# Patient Record
Sex: MALE | Race: BLACK OR AFRICAN AMERICAN | Employment: FULL TIME | URBAN - METROPOLITAN AREA
[De-identification: names, ages, dates, MRNs, and addresses within clinical notes are randomized per-mention and may not be internally consistent; named-entity substitution may affect disease eponyms.]

---

## 2021-06-19 ENCOUNTER — HOSPITAL ENCOUNTER (INPATIENT)
Facility: HOSPITAL | Age: 61
LOS: 6 days | Discharge: HOME | DRG: 246 | End: 2021-06-25
Attending: EMERGENCY MEDICINE | Admitting: HOSPITALIST
Payer: COMMERCIAL

## 2021-06-19 ENCOUNTER — APPOINTMENT (OUTPATIENT)
Dept: CT IMAGING | Facility: HOSPITAL | Age: 61
DRG: 246 | End: 2021-06-19
Attending: EMERGENCY MEDICINE
Payer: COMMERCIAL

## 2021-06-19 DIAGNOSIS — R55 SYNCOPE, CARDIOGENIC: Primary | ICD-10-CM

## 2021-06-19 DIAGNOSIS — I63.22: ICD-10-CM

## 2021-06-19 DIAGNOSIS — I63.22 STROKE DUE TO STENOSIS OF BASILAR ARTERY (HCC): ICD-10-CM

## 2021-06-19 DIAGNOSIS — I65.1 BASILAR ARTERY STENOSIS: ICD-10-CM

## 2021-06-19 DIAGNOSIS — I63.22 CEREBRAL INFARCTION DUE TO UNSPECIFIED OCCLUSION OR STENOSIS OF BASILAR ARTERY (HCC): ICD-10-CM

## 2021-06-19 PROCEDURE — 99222 1ST HOSP IP/OBS MODERATE 55: CPT | Performed by: HOSPITALIST

## 2021-06-19 PROCEDURE — 70450 CT HEAD/BRAIN W/O DYE: CPT | Performed by: EMERGENCY MEDICINE

## 2021-06-19 RX ORDER — ATORVASTATIN CALCIUM 20 MG/1
20 TABLET, FILM COATED ORAL ONCE
Status: COMPLETED | OUTPATIENT
Start: 2021-06-19 | End: 2021-06-19

## 2021-06-19 RX ORDER — ASPIRIN 325 MG
325 TABLET ORAL ONCE
Status: COMPLETED | OUTPATIENT
Start: 2021-06-19 | End: 2021-06-19

## 2021-06-19 RX ORDER — METOPROLOL TARTRATE 50 MG/1
50 TABLET, FILM COATED ORAL ONCE
Status: COMPLETED | OUTPATIENT
Start: 2021-06-19 | End: 2021-06-19

## 2021-06-19 RX ORDER — MECLIZINE HYDROCHLORIDE 25 MG/1
25 TABLET ORAL ONCE
Status: COMPLETED | OUTPATIENT
Start: 2021-06-19 | End: 2021-06-19

## 2021-06-19 RX ORDER — ONDANSETRON 2 MG/ML
4 INJECTION INTRAMUSCULAR; INTRAVENOUS ONCE
Status: COMPLETED | OUTPATIENT
Start: 2021-06-19 | End: 2021-06-19

## 2021-06-20 ENCOUNTER — APPOINTMENT (OUTPATIENT)
Dept: CV DIAGNOSTICS | Facility: HOSPITAL | Age: 61
DRG: 246 | End: 2021-06-20
Attending: HOSPITALIST
Payer: COMMERCIAL

## 2021-06-20 PROCEDURE — 99233 SBSQ HOSP IP/OBS HIGH 50: CPT | Performed by: HOSPITALIST

## 2021-06-20 PROCEDURE — 93306 TTE W/DOPPLER COMPLETE: CPT | Performed by: HOSPITALIST

## 2021-06-20 RX ORDER — SODIUM CHLORIDE, SODIUM LACTATE, POTASSIUM CHLORIDE, CALCIUM CHLORIDE 600; 310; 30; 20 MG/100ML; MG/100ML; MG/100ML; MG/100ML
INJECTION, SOLUTION INTRAVENOUS CONTINUOUS
Status: DISCONTINUED | OUTPATIENT
Start: 2021-06-20 | End: 2021-06-20

## 2021-06-20 RX ORDER — PANTOPRAZOLE SODIUM 40 MG/1
40 TABLET, DELAYED RELEASE ORAL
Status: DISCONTINUED | OUTPATIENT
Start: 2021-06-20 | End: 2021-06-25

## 2021-06-20 RX ORDER — METOPROLOL SUCCINATE 25 MG/1
25 TABLET, EXTENDED RELEASE ORAL
Status: DISCONTINUED | OUTPATIENT
Start: 2021-06-21 | End: 2021-06-21

## 2021-06-20 RX ORDER — HYDROCODONE BITARTRATE AND ACETAMINOPHEN 5; 325 MG/1; MG/1
1 TABLET ORAL EVERY 6 HOURS PRN
Status: DISCONTINUED | OUTPATIENT
Start: 2021-06-20 | End: 2021-06-25

## 2021-06-20 RX ORDER — ONDANSETRON 2 MG/ML
4 INJECTION INTRAMUSCULAR; INTRAVENOUS EVERY 6 HOURS PRN
Status: DISCONTINUED | OUTPATIENT
Start: 2021-06-20 | End: 2021-06-25

## 2021-06-20 RX ORDER — ONDANSETRON 2 MG/ML
4 INJECTION INTRAMUSCULAR; INTRAVENOUS EVERY 4 HOURS PRN
Status: ACTIVE | OUTPATIENT
Start: 2021-06-20 | End: 2021-06-20

## 2021-06-20 RX ORDER — HEPARIN SODIUM 5000 [USP'U]/ML
5000 INJECTION, SOLUTION INTRAVENOUS; SUBCUTANEOUS EVERY 12 HOURS SCHEDULED
Status: DISCONTINUED | OUTPATIENT
Start: 2021-06-20 | End: 2021-06-21

## 2021-06-20 RX ORDER — HYDRALAZINE HYDROCHLORIDE 20 MG/ML
10 INJECTION INTRAMUSCULAR; INTRAVENOUS EVERY 6 HOURS PRN
Status: DISCONTINUED | OUTPATIENT
Start: 2021-06-20 | End: 2021-06-25

## 2021-06-20 RX ORDER — DEXTROSE MONOHYDRATE 25 G/50ML
50 INJECTION, SOLUTION INTRAVENOUS
Status: DISCONTINUED | OUTPATIENT
Start: 2021-06-20 | End: 2021-06-25

## 2021-06-20 RX ORDER — HEPARIN SODIUM 5000 [USP'U]/ML
5000 INJECTION, SOLUTION INTRAVENOUS; SUBCUTANEOUS EVERY 12 HOURS
Status: DISCONTINUED | OUTPATIENT
Start: 2021-06-20 | End: 2021-06-20

## 2021-06-20 RX ORDER — ACETAMINOPHEN 325 MG/1
650 TABLET ORAL EVERY 6 HOURS PRN
Status: DISCONTINUED | OUTPATIENT
Start: 2021-06-20 | End: 2021-06-25

## 2021-06-20 RX ORDER — AMLODIPINE BESYLATE 5 MG/1
5 TABLET ORAL DAILY
Status: DISCONTINUED | OUTPATIENT
Start: 2021-06-20 | End: 2021-06-20

## 2021-06-20 NOTE — ED PROVIDER NOTES
Patient Seen in: [facility] Emergency Department    History   Patient presents with:  Nausea/Vomiting/Diarrhea      HPI    The patient presents to the ED complaining of intermittent episodes over the past several weeks where he feels lightheaded, (36.8 °C)   Temp src 06/19/21 2245 Temporal   SpO2 06/19/21 2033 97 %   O2 Device 06/19/21 2033 None (Room air)       All measures to prevent infection transmission during my interaction with the patient were taken.  The patient was already wearing a drople CBC WITH DIFFERENTIAL WITH PLATELET    Narrative: The following orders were created for panel order CBC With Differential With Platelet.                   Procedure                               Abnormality         Status (325 mg Oral Given 6/19/21 2245)   atorvastatin (LIPITOR) tab 20 mg (20 mg Oral Given 6/19/21 2245)   Metoprolol Tartrate (LOPRESSOR) tab 50 mg (50 mg Oral Given 6/19/21 2245)   Meclizine HCl (ANTIVERT) tab 25 mg (25 mg Oral Given 6/19/21 2340)         MDM obtaining history, performing a physical exam, bedside monitoring of interventions, collecting and interpreting tests and discussion with consultants but not including time spent performing procedures.     Condition upon leaving the department: Stable    Elenore Donate

## 2021-06-20 NOTE — PLAN OF CARE
Denies dizziness. Plan for 2Decho today. Pt stated that he is having pain on his left ear rated at about 5-6. Norco given and pt verbalized relief. Continue to monitor.    Problem: CARDIOVASCULAR - ADULT  Goal: Maintains optimal cardiac output and hemodynam

## 2021-06-20 NOTE — PLAN OF CARE
Pt states he still feels dizzy when he moves. No complaints of nausea or vomiting episodes. IVF initiated. Plan is to have an ECHO today. Will DC home with family once medically cleared. Fall precautions in place and wife at the bedside.     Problem: Patien behaviors that affect risk of falls.   - [place] fall precautions as indicated by assessment.  - Educate pt/family on patient safety including physical limitations  - Instruct pt to call for assistance with activity based on assessment  - Modify environme ordered  - Assess for signs and symptoms of hyperglycemia and hypoglycemia  - Administer ordered medications to maintain glucose within target range  - Assess barriers to adequate nutritional intake and initiate nutrition consult as needed  - Instruct kat

## 2021-06-20 NOTE — ED QUICK NOTES
Orders for admission, patient is aware of plan and ready to go upstairs. Any questions, please call ED RN kelly  at extension [phone].    Type of COVID test sent: rapid  COVID Suspicion level: Low    LOC at time of transport: a/o x3/3

## 2021-06-20 NOTE — PROGRESS NOTES
[facility]    Progress Note    Leifsonia Gill Patient Status:  Inpatient    7/10/1960 MRN [id number]   Location [facility] 3W/SW Attending Thuan Baltazar MD   Hosp Day # 1 PCP None Pcp     CC: N/V syncope   Subjective:    Ivon Abel Heparin Sodium (Porcine)  5,000 Units Subcutaneous 2 times per day   • [START ON 6/21/2021] metoprolol succinate  25 mg Oral Daily Beta Blocker       Current PRN Inpatient Meds:      glucose **OR** Glucose-Vitamin C **OR** dextrose **OR** glucose **OR** Gl Prediabetes  - diabetes educ ation  - ADA diet.    - monitor    Obesity   ZACHARY  - CPAP at bedtime  - counseled on weight loss    ETOH use  - counseled on cessation  - monitor for now     Quality:  · Diet: AHA   · PT/OT: deferred  · DVT Prophylaxis: SCD,

## 2021-06-20 NOTE — ED INITIAL ASSESSMENT (HPI)
Vomiting since 5pm. Patient complains of dizziness past few days   Notes feeling lightheaded.    Patient covered in emesis

## 2021-06-20 NOTE — CONSULTS
Cardiology Consult Note:  56year old male, with no significant past medical history presents to the ER after multiple episodes of lightheadedness and near syncope over the last few weeks.     He describes a sensation of lightheadedness whenever he gets up Exam:   Blood pressure (!) 150/94, pulse 61, temperature 98.2 °F (36.8 °C), temperature source Oral, resp. rate 16, height 6' 2\" (1.88 m), weight 275 lb (124.7 kg), SpO2 96 %.     Scheduled Meds:   • Insulin Aspart Pen  1-5 Units Subcutaneous TID CC and HS

## 2021-06-20 NOTE — PLAN OF CARE
Pt reported history of blurred vision prior to admission to the doctor. Denies blurred vision at this moment. Neuro checks started and will be done q 4. No other neurological signs noted at this time. VSS. Plan for MRI brain tomorrow.  Echo still needs to b

## 2021-06-20 NOTE — H&P
Malik Harrison. Patient Status:  Emergency    7/10/1960 MRN [id number]   Location [address] Attending J Carlos Mayen MD   Hosp Day # 0 PCP No primary care provider on file [] 88  Resp:  [16-36] 23  BP: (140-178)/() 152/79    General:  Alert and oriented. Diffuse skin problem:  None. Eye:  Pupils are equal, round and reactive to light, extraocular movements are intact, Normal conjunctiva.   HENT:  Normocephalic, Consider CPAP at night. Prophylaxis  Subcutaneous heparin    CODE STATUS  Full    Primary care physician  No primary care provider on file.     Disposition  Clinical course will dictate outcome      Rayshawn Rosas MD  6/19/2021  11:21 PM

## 2021-06-21 ENCOUNTER — APPOINTMENT (OUTPATIENT)
Dept: CT IMAGING | Facility: HOSPITAL | Age: 61
DRG: 246 | End: 2021-06-21
Attending: INTERNAL MEDICINE
Payer: COMMERCIAL

## 2021-06-21 ENCOUNTER — APPOINTMENT (OUTPATIENT)
Dept: CT IMAGING | Facility: HOSPITAL | Age: 61
DRG: 246 | End: 2021-06-21
Attending: Other
Payer: COMMERCIAL

## 2021-06-21 ENCOUNTER — APPOINTMENT (OUTPATIENT)
Dept: MRI IMAGING | Facility: HOSPITAL | Age: 61
DRG: 246 | End: 2021-06-21
Attending: HOSPITALIST
Payer: COMMERCIAL

## 2021-06-21 PROCEDURE — 70496 CT ANGIOGRAPHY HEAD: CPT | Performed by: OTHER

## 2021-06-21 PROCEDURE — 99223 1ST HOSP IP/OBS HIGH 75: CPT | Performed by: OTHER

## 2021-06-21 PROCEDURE — 75574 CT ANGIO HRT W/3D IMAGE: CPT | Performed by: INTERNAL MEDICINE

## 2021-06-21 PROCEDURE — 99233 SBSQ HOSP IP/OBS HIGH 50: CPT | Performed by: HOSPITALIST

## 2021-06-21 PROCEDURE — 70551 MRI BRAIN STEM W/O DYE: CPT | Performed by: HOSPITALIST

## 2021-06-21 PROCEDURE — 70498 CT ANGIOGRAPHY NECK: CPT | Performed by: OTHER

## 2021-06-21 RX ORDER — METOPROLOL TARTRATE 5 MG/5ML
5 INJECTION INTRAVENOUS SEE ADMIN INSTRUCTIONS
Status: DISCONTINUED | OUTPATIENT
Start: 2021-06-21 | End: 2021-06-23

## 2021-06-21 RX ORDER — ASPIRIN 300 MG
300 SUPPOSITORY, RECTAL RECTAL DAILY
Status: DISCONTINUED | OUTPATIENT
Start: 2021-06-21 | End: 2021-06-25

## 2021-06-21 RX ORDER — CLOPIDOGREL BISULFATE 75 MG/1
300 TABLET ORAL ONCE
Status: COMPLETED | OUTPATIENT
Start: 2021-06-21 | End: 2021-06-21

## 2021-06-21 RX ORDER — CHLORHEXIDINE GLUCONATE 4 G/100ML
30 SOLUTION TOPICAL
Status: COMPLETED | OUTPATIENT
Start: 2021-06-22 | End: 2021-06-22

## 2021-06-21 RX ORDER — DILTIAZEM HYDROCHLORIDE 5 MG/ML
5 INJECTION INTRAVENOUS SEE ADMIN INSTRUCTIONS
Status: DISCONTINUED | OUTPATIENT
Start: 2021-06-21 | End: 2021-06-23

## 2021-06-21 RX ORDER — METOPROLOL SUCCINATE 50 MG/1
50 TABLET, EXTENDED RELEASE ORAL
Status: DISCONTINUED | OUTPATIENT
Start: 2021-06-22 | End: 2021-06-23

## 2021-06-21 RX ORDER — ACETAMINOPHEN 650 MG/1
650 SUPPOSITORY RECTAL EVERY 4 HOURS PRN
Status: DISCONTINUED | OUTPATIENT
Start: 2021-06-21 | End: 2021-06-25

## 2021-06-21 RX ORDER — METOCLOPRAMIDE HYDROCHLORIDE 5 MG/ML
10 INJECTION INTRAMUSCULAR; INTRAVENOUS EVERY 8 HOURS PRN
Status: DISCONTINUED | OUTPATIENT
Start: 2021-06-21 | End: 2021-06-25

## 2021-06-21 RX ORDER — CLOPIDOGREL BISULFATE 75 MG/1
75 TABLET ORAL DAILY
Status: DISCONTINUED | OUTPATIENT
Start: 2021-06-22 | End: 2021-06-25

## 2021-06-21 RX ORDER — ATORVASTATIN CALCIUM 80 MG/1
80 TABLET, FILM COATED ORAL NIGHTLY
Status: DISCONTINUED | OUTPATIENT
Start: 2021-06-21 | End: 2021-06-25

## 2021-06-21 RX ORDER — DOCUSATE SODIUM 100 MG/1
100 CAPSULE, LIQUID FILLED ORAL 2 TIMES DAILY PRN
Status: DISCONTINUED | OUTPATIENT
Start: 2021-06-21 | End: 2021-06-25

## 2021-06-21 RX ORDER — METOPROLOL TARTRATE 100 MG/1
100 TABLET ORAL ONCE
Status: COMPLETED | OUTPATIENT
Start: 2021-06-22 | End: 2021-06-22

## 2021-06-21 RX ORDER — SODIUM CHLORIDE 9 MG/ML
INJECTION, SOLUTION INTRAVENOUS
Status: ACTIVE | OUTPATIENT
Start: 2021-06-22 | End: 2021-06-22

## 2021-06-21 RX ORDER — METOPROLOL TARTRATE 50 MG/1
50 TABLET, FILM COATED ORAL ONCE
Status: COMPLETED | OUTPATIENT
Start: 2021-06-21 | End: 2021-06-21

## 2021-06-21 RX ORDER — METOPROLOL TARTRATE 50 MG/1
50 TABLET, FILM COATED ORAL ONCE AS NEEDED
Status: COMPLETED | OUTPATIENT
Start: 2021-06-21 | End: 2021-06-21

## 2021-06-21 RX ORDER — ASPIRIN 325 MG
325 TABLET ORAL DAILY
Status: DISCONTINUED | OUTPATIENT
Start: 2021-06-21 | End: 2021-06-25

## 2021-06-21 RX ORDER — ACETAMINOPHEN 325 MG/1
650 TABLET ORAL EVERY 4 HOURS PRN
Status: DISCONTINUED | OUTPATIENT
Start: 2021-06-21 | End: 2021-06-25

## 2021-06-21 RX ORDER — DILTIAZEM HYDROCHLORIDE 5 MG/ML
INJECTION INTRAVENOUS
Status: DISPENSED
Start: 2021-06-21 | End: 2021-06-22

## 2021-06-21 RX ORDER — SODIUM CHLORIDE 9 MG/ML
INJECTION, SOLUTION INTRAVENOUS CONTINUOUS
Status: ACTIVE | OUTPATIENT
Start: 2021-06-21 | End: 2021-06-23

## 2021-06-21 RX ORDER — HEPARIN SODIUM 5000 [USP'U]/ML
5000 INJECTION, SOLUTION INTRAVENOUS; SUBCUTANEOUS EVERY 8 HOURS SCHEDULED
Status: DISCONTINUED | OUTPATIENT
Start: 2021-06-21 | End: 2021-06-25

## 2021-06-21 RX ORDER — METOPROLOL TARTRATE 100 MG/1
100 TABLET ORAL ONCE AS NEEDED
Status: COMPLETED | OUTPATIENT
Start: 2021-06-21 | End: 2021-06-21

## 2021-06-21 RX ORDER — METOPROLOL TARTRATE 5 MG/5ML
INJECTION INTRAVENOUS
Status: DISPENSED
Start: 2021-06-21 | End: 2021-06-22

## 2021-06-21 RX ORDER — ONDANSETRON 2 MG/ML
4 INJECTION INTRAMUSCULAR; INTRAVENOUS EVERY 6 HOURS PRN
Status: DISCONTINUED | OUTPATIENT
Start: 2021-06-21 | End: 2021-06-25

## 2021-06-21 RX ORDER — METOPROLOL TARTRATE 50 MG/1
50 TABLET, FILM COATED ORAL ONCE
Status: COMPLETED | OUTPATIENT
Start: 2021-06-22 | End: 2021-06-22

## 2021-06-21 RX ORDER — LABETALOL HYDROCHLORIDE 5 MG/ML
10 INJECTION, SOLUTION INTRAVENOUS EVERY 10 MIN PRN
Status: DISCONTINUED | OUTPATIENT
Start: 2021-06-21 | End: 2021-06-25

## 2021-06-21 RX ORDER — NITROGLYCERIN 0.4 MG/1
0.4 TABLET SUBLINGUAL ONCE
Status: DISCONTINUED | OUTPATIENT
Start: 2021-06-21 | End: 2021-06-23

## 2021-06-21 RX ORDER — METOPROLOL TARTRATE 100 MG/1
100 TABLET ORAL ONCE
Status: COMPLETED | OUTPATIENT
Start: 2021-06-21 | End: 2021-06-21

## 2021-06-21 NOTE — PHYSICAL THERAPY NOTE
PT evaluation orders received and chart reviewed. Spoke to RN, Ingrid Agarwal, who communicated that pt is preparing to go to CT. Will re-attempt this p.m. as schedule permits. 2nd attempt to see pt made this date at 16:00- pt off floor for imaging.  Will angelica

## 2021-06-21 NOTE — PLAN OF CARE
Problem: Patient Centered Care  Goal: Patient preferences are identified and integrated in the patient's plan of care  Description: Interventions:  - What would you like us to know as we care for you?  I'm a  and have been having these episode and hemodynamic stability  Description: INTERVENTIONS:  - Monitor vital signs, rhythm, and trends  - Monitor for bleeding, hypotension and signs of decreased cardiac output  - Evaluate effectiveness of vasoactive medications to optimize hemodynamic stabili maintained  Description: INTERVENTIONS:  - Monitor labs and assess for signs and symptoms of volume excess or deficit  - Monitor intake, output and patient weight  - Monitor urine specific gravity, serum osmolarity and serum sodium as indicated or ordered appropriately for assistance. Bed rest, plan for MRI and stress test in am. Neuro checks q 4, pt without complaints of dizziness or vision changes. Will continue to monitor.      **Pt had episode of frequent PVCs, dizziness and was diaphoretic, EKG obtained

## 2021-06-21 NOTE — CM/SW NOTE
06/21/21 1600   CM/SW Referral Data   Referral Source Physician   Reason for Referral   [facility] TERM ACUTE [facility] CARE AT [facility] Evaluation)   Informant Patient   Patient Info   Patient's [address]   Number of Levels in Home 2   Number of Stair in Home 12   Patient lives with

## 2021-06-21 NOTE — IMAGING NOTE
1222 TO RAD HOLDING VIA BED BY TRANSPORT FROM ROOM 304    HX TAKEN : INPT , C/O DIZZINESS, ARRHYTHMIA ON TELE.      PT CONSENTED      BASELINE VITAL SIGNS   HR 58-68  BP  153/81  BMI 36.4  LBS     CTA ORDERED BY INPT      METOPROLOL PO GIVEN 50 MILLIG

## 2021-06-21 NOTE — PROGRESS NOTES
Patient seen in follow up. STAN RN. At around 1 am pt had dizziness and run of ventricular rhythm at the same time. This morning also he is having runs of ventricular rhythm and excess PVCs. No reported chest pain or sob. No new complaints.  Has a s mg, Oral, Daily Beta Blocker  hydrALAzine HCl (APRESOLINE) injection 10 mg, 10 mg, Intravenous, Q6H PRN  carbamide peroxide (EAR DROPS EARWAX AID) 6.5 % otic solution 5 drop, 5 drop, Left Ear, BID      No medications prior to admission.     CT BRAIN OR HEAD

## 2021-06-21 NOTE — PAYOR COMM NOTE
--------------  ADMISSION REVIEW     Payor: [address]  Subscriber #:  [id number]  Authorization Number: [id number]      Admit date: 6/19/21  Admit time: 11:59 PM       Admitting Physician: Lazarus Fagan MD  Attending Physician:  Xavier Reynolds medications prior to admission. No family history on file.     Smoking Status: Social History    Tobacco Use      Smoking status: Never Smoker      Smokeless tobacco: Never Used      ROS  Pertinent positives: Dizziness, lightheaded, sweating, syncope Tenderness: There is no abdominal tenderness. Musculoskeletal:         General: No deformity. Skin:     General: Skin is warm and dry. Neurological:      Mental Status: He is alert and oriented to person, place, and time.    Psychiatric:         Mood rm:42  [id number]    Additional Information (per Vision Radiologist):          CT HEAD (without contrast)    IMPRESSION:  No acute intracranial abnormality by CT. No acute intracranial hemorrhage or pathologic extra-axial fluid collection.  No acute fracture over the past several weeks. Patient feeling well on ED arrival and no significant symptoms during his ED stay. No evidence for ACS given nonischemic EKG and normal troponin. Normal CT head and laboratory testing otherwise.   Single episode of nonsustain 6/19/2021    Chief Complaint:  Patient presents with:  Nausea/Vomiting/Diarrhea      History of Present Illness:   Xin Berry is a(n) 56year old male, with no significant past medical history presents to the ER after multiple episodes of lightheadedness atraumatic. Neck:  Supple, non-tender, no carotid bruit, no jugular venous distention, no lymphadenopathy, no thyromegaly.   Respiratory:  Lungs are clear to auscultation, respirations are non-labored, breath sounds are equal, symmetrical chest wall expans ADMINISTERED IN LAST 1 DAY:  amLODIPine Besylate (NORVASC) tab 5 mg     Date Action Dose Route User    6/20/2021 1311 Given 5 mg Oral Elinor Hansen RN      carbamide peroxide (EAR DROPS EARWAX AID) 6.5 % otic solution 5 drop     Date Action Dose Route 6/20/2021  2:16 PM  6/21  IMPRESSION:  Presyncope with associated ventricular run and PVCs on the monitor. No anginal symptoms.     PLAN:     Cancel stress test. FU ekg today. Coronary CT angiogram. Increase Toprol to 50/d.  PVC morphology is RBBB and super

## 2021-06-21 NOTE — SLP NOTE
SLP orders received and acknowledged. SLP attempt this PM, however, pt off unit for testing. SLP to follow up as pt available/appropriate. Please contact SLP with any questions, concerns, and/or change in status. Thank you. Annita Rosenbaum M.S. [id number] [facility]

## 2021-06-21 NOTE — PROGRESS NOTES
[facility]    Progress Note    [place] Me[place] Patient Status:  Inpatient    7/10/1960 MRN [id number]   Location [facility] 3W/SW Attending Marielle Heller MD   Hosp Day # 2 PCP None Pcp     CC: N/V syncope   Subjective:    Efren Parikh Subcutaneous TID CC and HS   • Pantoprazole Sodium  40 mg Oral QAM AC   • Heparin Sodium (Porcine)  5,000 Units Subcutaneous 2 times per day   • carbamide peroxide  5 drop Left Ear BID       Current PRN Inpatient Meds:      glucose **OR** Glucose-Vitamin C criteria for LVH met.  - Nonspecific T-abnormality. ABNORMAL When compared with ECG of 06/19/2021 20:39:15 intermittent sinus beats are now present Electronically signed on 06/21/2021 at 10:51 by JEMMA Cr     EKG 12 Lead    Result Date: 6/19/2021  ECG R

## 2021-06-21 NOTE — PLAN OF CARE
VS stable. Scheduled for CTA today and MRI of the brain. Denies dizziness this morning. Wife at bedside. NPO and consent signed for CTA. Patient updated on POC and safety.     Problem: Patient Centered Care  Goal: Patient preferences are identified and inte strengthening/mobility  - Encourage toileting schedule  Outcome: Progressing     Problem: CARDIOVASCULAR - ADULT  Goal: Maintains optimal cardiac output and hemodynamic stability  Description: INTERVENTIONS:  - Monitor vital signs, rhythm, and trends  - Mo ordered  - Instruct patient on fluid and nutrition restrictions as appropriate  Outcome: Progressing  Goal: Hemodynamic stability and optimal renal function maintained  Description: INTERVENTIONS:  - Monitor labs and assess for signs and symptoms of volume aphasic patients to use assistive/communication devices  Outcome: Progressing

## 2021-06-21 NOTE — CONSULTS
[facility]  [facility], [address] [place] [address]  [phone]          [place] [id number]    Report of Consultation    Sumit Vizcarra Patient Status:  Inpatient     7/10/1960 MRN  symptoms concerning for facial droop with a? Ipsilateral ptosis. MRI of the brain was ordered on Sunday, 6/20/2021. It revealed multiple embolic infarcts in the posterior circulation earlier today. HPI reviewed with the patient and his wife.   3 week higher rate of stroke recurrence compared to extracranial atherosclerotic disease. I explained that the risk of recurrent stroke was 10 to 12 %/year.   Explained that his risk of recurrent stroke was inherently higher, and that even if he did not have a ca Resource Strain:       Difficulty of Paying Living Expenses:   Food Insecurity:       Worried About [employer] in the Last Year:       Ran Out of Food in the Last Year:   Transportation Needs:       Lack of Transportation (Medical):       Lack of HCl, acetaminophen, HYDROcodone-acetaminophen, hydrALAzine HCl      Objective:    Last vitals and weight :   06/21/21  1315   BP:    Pulse: 64   Resp:    Temp:      @FLOWCHS(14)@    Exam:  - General: appears stated age and no distress  - CV: Normal S1-S2 R 2+ 2+  2+ 2+   L 2+ 2+  2+ 2+   Adductor Spread: No adductor spread noted. Frontal release signs:Not assessed.     Jaw Jerk:    Danelle's sign:absent   Nonsustained clonus: Absent   Sustained clonus: Absent   - Sensory:   Light touch: normal  Tempera Imaging revealed: multiple posterior circulation infarct including in the tip of the occipital lobe and throughout the cerebellum. CTA demonstrates short segment of severe  basilar stenosis            Kamilla Borrego is a 56year old RH  man w/ a pmhx sig.  fo necessary given the disease seen on his CT scan, low suspicion that it is responsible for his recurrent episodes.       1. Short segment focal stenosis of the basilar artery with multiple posterior circulation infarcts in the setting of 2 weeks of recurrent elevated for the next 7 to 14 days. This is to allow flow through the focal area of stenosis while collateral vessels are hopefully forming. Additional brain and vascular imaging ordered:   a. None at this time. No indication for cerebral angiography.   Debara Bosworth neuroimaging and cardiovascular studies. Disclaimer: This record was dictated using [address]. There may be errors due to voice recognition problems that were not realized and corrected during the completion of the note. Thank you.   Vi

## 2021-06-22 ENCOUNTER — APPOINTMENT (OUTPATIENT)
Dept: MRI IMAGING | Facility: HOSPITAL | Age: 61
DRG: 246 | End: 2021-06-22
Attending: Other
Payer: COMMERCIAL

## 2021-06-22 ENCOUNTER — APPOINTMENT (OUTPATIENT)
Dept: CT IMAGING | Facility: HOSPITAL | Age: 61
DRG: 246 | End: 2021-06-22
Attending: Other
Payer: COMMERCIAL

## 2021-06-22 ENCOUNTER — APPOINTMENT (OUTPATIENT)
Dept: INTERVENTIONAL RADIOLOGY/VASCULAR | Facility: HOSPITAL | Age: 61
DRG: 246 | End: 2021-06-22
Attending: INTERNAL MEDICINE
Payer: COMMERCIAL

## 2021-06-22 PROCEDURE — 99233 SBSQ HOSP IP/OBS HIGH 50: CPT | Performed by: HOSPITALIST

## 2021-06-22 PROCEDURE — 70546 MR ANGIOGRAPH HEAD W/O&W/DYE: CPT | Performed by: OTHER

## 2021-06-22 PROCEDURE — B2151ZZ FLUOROSCOPY OF LEFT HEART USING LOW OSMOLAR CONTRAST: ICD-10-PCS | Performed by: INTERNAL MEDICINE

## 2021-06-22 PROCEDURE — 99233 SBSQ HOSP IP/OBS HIGH 50: CPT | Performed by: OTHER

## 2021-06-22 PROCEDURE — B2111ZZ FLUOROSCOPY OF MULTIPLE CORONARY ARTERIES USING LOW OSMOLAR CONTRAST: ICD-10-PCS | Performed by: INTERNAL MEDICINE

## 2021-06-22 PROCEDURE — 027034Z DILATION OF CORONARY ARTERY, ONE ARTERY WITH DRUG-ELUTING INTRALUMINAL DEVICE, PERCUTANEOUS APPROACH: ICD-10-PCS | Performed by: INTERNAL MEDICINE

## 2021-06-22 PROCEDURE — 70551 MRI BRAIN STEM W/O DYE: CPT | Performed by: OTHER

## 2021-06-22 PROCEDURE — 4A023N7 MEASUREMENT OF CARDIAC SAMPLING AND PRESSURE, LEFT HEART, PERCUTANEOUS APPROACH: ICD-10-PCS | Performed by: INTERNAL MEDICINE

## 2021-06-22 RX ORDER — NITROGLYCERIN 20 MG/100ML
INJECTION INTRAVENOUS
Status: COMPLETED
Start: 2021-06-22 | End: 2021-06-22

## 2021-06-22 RX ORDER — DIPHENHYDRAMINE HYDROCHLORIDE 50 MG/ML
INJECTION INTRAMUSCULAR; INTRAVENOUS
Status: COMPLETED
Start: 2021-06-22 | End: 2021-06-22

## 2021-06-22 RX ORDER — VERAPAMIL HYDROCHLORIDE 2.5 MG/ML
INJECTION, SOLUTION INTRAVENOUS
Status: COMPLETED
Start: 2021-06-22 | End: 2021-06-22

## 2021-06-22 RX ORDER — MIDAZOLAM HYDROCHLORIDE 1 MG/ML
INJECTION INTRAMUSCULAR; INTRAVENOUS
Status: COMPLETED
Start: 2021-06-22 | End: 2021-06-22

## 2021-06-22 RX ORDER — HEPARIN SODIUM 1000 [USP'U]/ML
INJECTION, SOLUTION INTRAVENOUS; SUBCUTANEOUS
Status: COMPLETED
Start: 2021-06-22 | End: 2021-06-22

## 2021-06-22 RX ORDER — SODIUM CHLORIDE 9 MG/ML
INJECTION, SOLUTION INTRAVENOUS CONTINUOUS
Status: DISCONTINUED | OUTPATIENT
Start: 2021-06-22 | End: 2021-06-23

## 2021-06-22 RX ORDER — CLOPIDOGREL BISULFATE 75 MG/1
TABLET ORAL
Status: COMPLETED
Start: 2021-06-22 | End: 2021-06-22

## 2021-06-22 RX ORDER — SODIUM CHLORIDE 9 MG/ML
INJECTION, SOLUTION INTRAVENOUS CONTINUOUS
Status: DISCONTINUED | OUTPATIENT
Start: 2021-06-22 | End: 2021-06-22

## 2021-06-22 RX ORDER — LIDOCAINE HYDROCHLORIDE 20 MG/ML
INJECTION, SOLUTION EPIDURAL; INFILTRATION; INTRACAUDAL; PERINEURAL
Status: COMPLETED
Start: 2021-06-22 | End: 2021-06-22

## 2021-06-22 NOTE — PROGRESS NOTES
Patient seen in follow up.    Tele improved, 1 ventricular run  Dw neurology last night, primary service  Time spent > 35 min     06/22/21  0604   BP: (!) 123/93   Pulse: 63   Resp: 18   Temp: 98.1 °F (36.7 °C)       Intake/Output Summary (Last 24 flores mg, Oral, Daily  glucose (DEX4) oral liquid 15 g, 15 g, Oral, Q15 Min PRN   Or  Glucose-Vitamin C (DEX-4) chewable tab 4 tablet, 4 tablet, Oral, Q15 Min PRN   Or  dextrose 50 % injection 50 mL, 50 mL, Intravenous, Q15 Min PRN   Or  glucose (DEX4) oral liqu (KBP=75404/61307)    Result Date: 6/21/2021  CONCLUSION:  1. Short segment severe stenosis of the basilar artery. 2. No flow limiting stenosis, occlusion or dissection of the major cervical arteries.   Mild calcific atherosclerosis involving the proximal in

## 2021-06-22 NOTE — PROGRESS NOTES
MD note:    D/w Dr. Tabitha Wadsworth. Patient with multiple foci of stroke with severe stenosis of proximal basilar artery. Recommend DAPT/Statin and permissive hypertension. Will load with plavix tonight.

## 2021-06-22 NOTE — OCCUPATIONAL THERAPY NOTE
OCCUPATIONAL THERAPY EVALUATION - INPATIENT     Room Number: 156/970-T  Evaluation Date: 6/22/2021  Type of Evaluation: Quick Eval  Presenting Problem:  (sycope)    Physician Order: IP Consult to Occupational Therapy  Reason for Therapy: ADL/IADL Dysfunc history. HOME SITUATION  Type of Home: House  Home Layout: Two level; Able to live on main level  Lives With: Spouse  Toilet and Equipment: Standard height toilet  Shower/Tub and Equipment: Walk-in shower; Tub-shower combo  Other Equipment: None  Occupati SBA    Bedroom Mobility: SBA-CGA with RW    FUNCTIONAL ADL ASSESSMENT  Grooming: mod I   Feeding: indep  Bathing: cga  Toileting: sba  Upper Extremity Dressing: indep  Lower Extremity Dressing: sba-cga    Education Provided: Educated pt on role of OT in ac

## 2021-06-22 NOTE — PROGRESS NOTES
[facility] HOSP - [facility]    Progress Note    Anita Sheppard Patient Status:  Inpatient    7/10/1960 MRN [id number]   Location [facility] 3W/SW Attending Kalie Schwab MD   Hosp Day # 3 PCP None Pcp     CC: dizziness   Subjective:    Anita Sheppard Units Subcutaneous Q8H [facility]   • Clopidogrel Bisulfate  75 mg Oral Daily   • Insulin Aspart Pen  1-5 Units Subcutaneous TID CC and HS   • Pantoprazole Sodium  40 mg Oral QAM AC   • carbamide peroxide  5 drop Left Ear BID       Current PRN Inpatient Meds: esophagus at the gastroesophageal junction may be related to esophagitis. If clinically indicated, further evaluation with direct visualization could be performed. CT cardiac over read.  Please see dedicated report by the cardiologist.   Dictated by (CST): in Er and now with ventricular run/PVCs on tele  - 2D ECHO - normal EF, no wma  - MRI brain with multiple lesions of acute CVA B/L L>R cerebellar lacunar, tiny cortical infarcts R occipital lobe and acute lacunar infarcts in inferior archana.    - neuro checks

## 2021-06-22 NOTE — DIETARY NOTE
NUTRITION EDUCATION NOTE    Received consult for nutrition education. Appropriate education and handout provided. See education section of Epic for specifics.     Lydia Guzman RDN, LDN   Clinical Nutrition  Ext [phone]

## 2021-06-22 NOTE — PAYOR COMM NOTE
--------------  CONTINUED STAY REVIEW    Payor: [address] [facility]  Subscriber #:  [id number]  Authorization Number: [id number]      Admit date: 6/19/21  Admit time: 11:59 PM    FAXING CLINICAL UPDATE FOR 6/21/21- 6/22/21 6/21/21   CC: N/V syncope cards recs and plan CTA instead, EP consult  - orthostatic V/S negative.    - cards following  - consider neuro eval if cardiac workup neg.      Essential HTN   - has been off meds x months   - start metoprolol 12.5mg daily ( will help NSVT he had in ER) - 3.9     --  109  --  107   CO2 25.0  --  26.0  --  25.0     Imaging/EKG:   MRI BRAIN (CPT=70551)     Result Date: 6/21/2021  CONCLUSION:  1. Multiple acute bilateral left greater than right cerebellar lacunar infarcts.   2. Tiny cortical and subcortic met.  - Nonspecific T-abnormality. ABNORMAL When compared with ECG of 06/21/2021 00:24:22 PVC's have decreased Electronically signed on 06/21/2021 at 10:51 by JEMMA Bolanos     Assessment and Plan:      Near Syncope  Secondary to acute CVA and CAD.    - Had Given 30 mL Topical Marcell Calhoun RN      Clopidogrel Bisulfate (PLAVIX) tab 300 mg     Date Action Dose Route User    6/21/2021 2340 Given 300 mg Oral Marcell Calhoun RN      Clopidogrel Bisulfate (PLAVIX) tab 75 mg     Date Action Dose Route Date Action Dose Route User    6/21/2021 2032 Given 50 mg Oral Maddie Bui RN      Metoprolol Tartrate (LOPRESSOR) tab 50 mg     Date Action Dose Route User    6/22/2021 7431 Given 50 mg Oral Maddie Bui RN      Pantoprazole Sodium (SD

## 2021-06-22 NOTE — PHYSICAL THERAPY NOTE
PHYSICAL THERAPY EVALUATION - INPATIENT     Room Number: 536/197-W  Evaluation Date: 6/22/2021  Type of Evaluation: Initial   Physician Order: PT Eval and Treat    Presenting Problem: syncope  Reason for Therapy: Mobility Dysfunction and Discharge Ishan from home with family support. Utilized RW this session as pt was s/p heart catheterization, however, trial without next session    Patient will benefit from continued IP PT services to address these deficits in preparation for discharge.     DISCHARGE REC Room Air: 99    AM-PAC '6-Clicks' [address]  How much difficulty does the patient currently have. ..  -   Turning over in bed (including adjusting bedclothes, sheets and blankets)?: None   -   Sitting down on and standing up from instructions provided to patient in preparation for discharge.    Goal #5   Current Status

## 2021-06-22 NOTE — PLAN OF CARE
VS stable. Cardiac cath today. R wrist and R groin access. Pedal pulses palpable. Wife at bedside. Neuro checks q4. No neuro changes.      Problem: Patient Centered Care  Goal: Patient preferences are identified and integrated in the patient's plan of care toileting schedule  Outcome: Progressing     Problem: CARDIOVASCULAR - ADULT  Goal: Maintains optimal cardiac output and hemodynamic stability  Description: INTERVENTIONS:  - Monitor vital signs, rhythm, and trends  - Monitor for bleeding, hypotension and nutrition restrictions as appropriate  Outcome: Progressing  Goal: Hemodynamic stability and optimal renal function maintained  Description: INTERVENTIONS:  - Monitor labs and assess for signs and symptoms of volume excess or deficit  - Monitor intake, out assistive/communication devices  Outcome: Progressing

## 2021-06-22 NOTE — PHYSICAL THERAPY NOTE
PT evaluation orders received and chart reviewed. Patient is off floor for L heart cath. Will re-attempt this p.m. as schedule permits.      Thank you,  Jerardo Musa,  PT, DPT

## 2021-06-22 NOTE — PLAN OF CARE
Pt. resting comfortably throughout most of shift. Wife at bedside. IV fluids continued. No complaints of CP or SOB. Neuro q4. No significant neuro changes overnight. Pt. had one episode of dizziness. NPO at midnight. No complaints at this time.  Will contin assessment  - Modify environment to reduce risk of injury  - Provide assistive devices as appropriate  - Consider OT/PT consult to assist with strengthening/mobility  - Encourage toileting schedule  Outcome: Progressing     Problem: CARDIOVASCULAR - ADULT to increase activity and self care with guidance from PT/OT  - Encourage visually impaired, hearing impaired and aphasic patients to use assistive/communication devices  Outcome: Progressing

## 2021-06-22 NOTE — PROCEDURES
[facility]    PATIENT'S NAME: OSVALDO ELKINS   ATTENDING PHYSICIAN: Wallace Pina MD   OPERATING PHYSICIAN: Manas Ta DO   PATIENT ACCOUNT#:   [de-identified]    LOCATION:  [address] [place] [address] RECORD #:   [id number]       DATE OF BIRTH:  [birth year]/ descending artery has a 30% heavily calcified ostial stenosis. Then the vessel is ectatic/aneurysmal throughout with mild obstruction appreciated. The circumflex coronary artery is a large but nondominant vessel giving rise to 1 major marginal branch.   I A 95% mid circumflex coronary artery stenosis status post drug-eluting stent reduced to 0%.   2.   Chronic total occlusion of a small right coronary artery with distal vessel of poor caliber probed with a wire confirming that it is in fact a chronic occlus

## 2021-06-22 NOTE — SLP NOTE
Interval History: Pt seen and examined. Small bowl obstruction, continue NPO and NGT to LIWS. Will follow.     Review of Systems   Constitutional: Positive for appetite change (decreased). Negative for chills, diaphoresis, fatigue and fever.   HENT: Negative for congestion, ear discharge, rhinorrhea, sinus pressure, sore throat and trouble swallowing.    Eyes: Negative for discharge and visual disturbance.   Respiratory: Negative for apnea, cough, choking, chest tightness, shortness of breath, wheezing and stridor.    Cardiovascular: Negative for chest pain, palpitations and leg swelling.   Gastrointestinal: Positive for abdominal pain and nausea. Negative for abdominal distention, diarrhea and vomiting.   Endocrine: Negative for cold intolerance and heat intolerance.   Genitourinary: Negative for dysuria, frequency and hematuria.   Musculoskeletal: Negative for arthralgias, back pain, myalgias and neck pain.   Skin: Negative for pallor and rash.   Neurological: Negative for dizziness, seizures, syncope, weakness and headaches.   Psychiatric/Behavioral: Negative for agitation, confusion and sleep disturbance.     Objective:     Vital Signs (Most Recent):  Temp: 97.3 °F (36.3 °C) (03/13/20 0729)  Pulse: 89 (03/13/20 0729)  Resp: 18 (03/13/20 0729)  BP: (!) 167/80 (03/13/20 0729)  SpO2: 96 % (03/13/20 0729) Vital Signs (24h Range):  Temp:  [97.3 °F (36.3 °C)-98 °F (36.7 °C)] 97.3 °F (36.3 °C)  Pulse:  [] 89  Resp:  [16-20] 18  SpO2:  [96 %-97 %] 96 %  BP: (142-183)/(66-88) 167/80     Weight: 73.3 kg (161 lb 11.2 oz)  Body mass index is 21.33 kg/m².    Intake/Output Summary (Last 24 hours) at 3/13/2020 1216  Last data filed at 3/13/2020 0655  Gross per 24 hour   Intake 3093.33 ml   Output 1250 ml   Net 1843.33 ml      Physical Exam   Constitutional: He is oriented to person, place, and time. No distress.   HENT:   Mouth/Throat: No oropharyngeal exudate.   Eyes: Right eye exhibits no discharge. Left eye exhibits  SPEECH/LANGUAGE/COGNITIVE EVALUATION - INPATIENT    Admission Date: 6/19/2021  Evaluation Date: 06/22/21    Reason for Referral: Stroke protocol    ASSESSMENT & PLAN   ASSESSMENT & IMPRESSION    Imaging Results:   MRI 6/22/21:  CONCLUSION:   1. Multiple ac PLAN:    Recommend f/u for cognitive-communicate treatment at next level of care.        Discharge Recommendations/Plan: Undetermined    Patient Experiencing Pain: No  Prior Living Situation: Home with spouse  Prior Level of Function: Independent no discharge.   Neck: No JVD present.   Cardiovascular: Exam reveals no gallop and no friction rub.   No murmur heard.  Pulmonary/Chest: No stridor. No respiratory distress. He has no wheezes. He has no rales. He exhibits no tenderness.   Abdominal: He exhibits no distension and no mass. There is no tenderness. There is no rebound and no guarding. No hernia.   NGT low wall suction    Musculoskeletal: He exhibits no edema or deformity.   Neurological: He is alert and oriented to person, place, and time.   Skin: Skin is warm and dry. Capillary refill takes less than 2 seconds. He is not diaphoretic.   Nursing note and vitals reviewed.      Significant Labs:   CBC:   Recent Labs   Lab 03/11/20 2220 03/12/20  1043 03/13/20  0552   WBC 16.72* 10.26 9.63   HGB 8.8* 8.1* 8.5*   HCT 28.4* 27.9* 28.9*   * 297 317     CMP:   Recent Labs   Lab 03/11/20 2220 03/12/20  1043 03/13/20  0552    140 141   K 4.9 5.3* 5.0    106 109   CO2 18* 24 20*   * 111* 118*   BUN 39* 43* 48*   CREATININE 2.8* 2.9* 2.7*   CALCIUM 10.0 9.8 9.6   PROT 8.5* 8.1  --    ALBUMIN 3.7 3.6  --    BILITOT 0.4 0.3  --    ALKPHOS 196* 191*  --    AST 19 20  --    ALT 13 14  --    ANIONGAP 16 10 12   EGFRNONAA 22* 21* 23*       Significant Imaging:     Imaging Results          X-Ray Chest AP Portable (Final result)  Result time 03/12/20 09:22:40    Final result by Saji Issa MD (03/12/20 09:22:40)                 Impression:      As above.      Electronically signed by: Saji Issa  Date:    03/12/2020  Time:    09:22             Narrative:    EXAMINATION:  XR CHEST AP PORTABLE    CLINICAL HISTORY:  . Presence of other specified devices    TECHNIQUE:  Single frontal portable view of the chest was performed.    COMPARISON:  02/02/2020    FINDINGS:  Support devices:    Esophagogastric tube in satisfactory position overlying the body of the stomach.    Chronic interstitial coarsening.  Linear reticular interstitial opacification  reduced in severity compared to 02/02/2020.  No pneumothorax.  Suggestion of tiny right pleural effusion unchanged.  Surgical clips right axilla and left cervical region.    The cardiac silhouette is normal in size. The hilar and mediastinal contours are unremarkable.    Bones are intact.                               CT Abdomen Pelvis  Without Contrast (Final result)  Result time 03/12/20 08:01:28    Final result by Aurelio Curtis MD (03/12/20 08:01:28)                 Impression:      Prominent small bowel loops are seen throughout the abdomen with transition suspected within the upper pelvis likely at the site of a bowel anastomosis with decompressed ileum distally.  Small bowel loops are thickened at the level near the obstruction without evidence of pneumatosis.  Findings concerning for bowel obstruction.    Small hiatal hernia containing contrast and mild thickening at the GE junction likely reflects reflux disease.  Thickening of the rugal folds throughout the stomach likely reflects gastritis. Recommend endoscopy follow-up.    All CT scans at this facility use dose modulation, iterative reconstruction and/or weight based dosing when appropriate to reduce radiation dose to as low as reasonably achievable.      Electronically signed by: Aurelio Curtis MD  Date:    03/12/2020  Time:    08:01             Narrative:    EXAMINATION:  CT ABDOMEN PELVIS WITHOUT CONTRAST    CLINICAL HISTORY:  Abdominal pain, unspecified;    TECHNIQUE:  Routine 5 mm non-contrast images of the abdomen and pelvis were done.  Sagittal and coronal reformats were also submitted for interpretation.    COMPARISON:  None    FINDINGS:  No consolidation.  Chronic interstitial thickening suspected at the right lung base.  Acute interstitial pneumonia not excluded.  No pleural effusion or pneumothorax identified heart size is normal.  Severe coronary disease.    The liver is normal in size and attenuation with no focal hepatic abnormality.   Moderate gallbladder distension.  No stones are identified.  No significant intrahepatic ductal dilatation.  Common bile duct is dilated measuring 11 mm without focal obstruction or stone.    The spleen, pancreas, and adrenal glands are unremarkable.  Benign splenic granuloma are noted.    Kidneys are small in size.  Nonobstructing stone lower pole of the right kidney.  Horseshoe configuration is noted.  Nonobstructing stone lower pole right kidney.  Mild left-sided hydronephrosis double-J ureteral stent is noted in satisfactory position.  Bladder demonstrates mild nonspecific bladder wall thickening which can be seen with cystitis..    Small hiatal hernia containing contrast and mild thickening at the GE junction likely reflects reflux disease.  Thickening of the rugal folds throughout the stomach likely reflects gastritis.  Recommend endoscopy follow-up.  Duodenal diverticulum is seen along the 3rd portion the duodenum.  Prominent small bowel loops are seen throughout the abdomen with transition suspected within the upper pelvis likely at the site of a bowel anastomosis with decompressed ileum distally.  Findings concerning for bowel obstruction.  Large bowel demonstrates fatty infiltration of the wall of the colon likely reflecting chronic inflammatory process.  Mild constipation.  Appendix is not seen..  Moderate diverticulosis seen throughout the descending and sigmoid colon.  No definite evidence for acute diverticulitis.  Trace ascites.  No free air.    There is no evidence of lymph node enlargement in the abdomen or pelvis.  Shotty nodes are seen within the mesentery and retroperitoneum.    The abdominal aorta is small caliber with severe atherosclerotic calcifications.  Subclavian femoral bypass and fem-fem bypass grafts are noted.  Extensive clips are seen in the bilateral groin.    Moderate degenerative changes throughout the lumbar spine.  Chronic right rib fracture deformity involving the 7th rib  laterally.  Chronic compression deformities at L3 and L1.                               X-Ray Abdomen Flat And Erect (Final result)  Result time 03/11/20 22:41:16    Final result by Aurelio Briceno MD (03/11/20 22:41:16)                 Impression:      Dilated loops of small bowel with multiple air-fluid levels consistent with a small-bowel obstruction.  No definite evidence of free air.      Electronically signed by: Aurelio Briceno MD  Date:    03/11/2020  Time:    22:41             Narrative:    EXAMINATION:  XR ABDOMEN FLAT AND ERECT    CLINICAL HISTORY:  Generalized abdominal pain    TECHNIQUE:  Flat and erect AP views of the abdomen were performed.    COMPARISON:  03/27/2019    FINDINGS:  Left ureteral stent.  Multiple air-fluid levels with distended small bowel loops consistent with a small bowel obstruction.

## 2021-06-22 NOTE — OPERATIVE REPORT
Preop diagnosis: abnormal CTA  Post op diagnosis: CAD  Procedures: LHC cors, stent to LCX  Findings: proximal to mid LCX with 95% stenosis, proximal RCA 60-70% stenosis then 100%  where the vessel becomes small, apically severe distal disease involving

## 2021-06-22 NOTE — SLP NOTE
ADULT SWALLOWING EVALUATION    ASSESSMENT    ASSESSMENT/OVERALL IMPRESSION:    This BSE was ordered d/t stroke protocol. No PMH of dysphagia at [facility]. Pt denies any past swallowing difficulty. Pt on solid/thin liquids at home, with spouse.      Brain MRI 5/ care. BSE results/recommendations discussed with Pt; fair understanding/reinforcement needed. PLAN:    To f/u x1-2 sessions/meals for dysphagia treatment. Will ensure safe tolerance of diet and reinforce swallowing/aspiration precautions.  Will monitor silent aspiration.)    GOALS  Goal #1 The patient will tolerate solid consistency and thin  liquids without overt signs or symptoms of aspiration with 100 % accuracy over 1-2 session(s).   In Progress   Goal #2 The patient will utilize compensatory strategi

## 2021-06-23 PROCEDURE — 99233 SBSQ HOSP IP/OBS HIGH 50: CPT | Performed by: HOSPITALIST

## 2021-06-23 PROCEDURE — 99233 SBSQ HOSP IP/OBS HIGH 50: CPT | Performed by: OTHER

## 2021-06-23 RX ORDER — METOPROLOL SUCCINATE 100 MG/1
100 TABLET, EXTENDED RELEASE ORAL
Status: DISCONTINUED | OUTPATIENT
Start: 2021-06-24 | End: 2021-06-25

## 2021-06-23 RX ORDER — SODIUM CHLORIDE 0.9 % (FLUSH) 0.9 %
10 SYRINGE (ML) INJECTION AS NEEDED
Status: DISCONTINUED | OUTPATIENT
Start: 2021-06-23 | End: 2021-06-25

## 2021-06-23 RX ORDER — MIDODRINE HYDROCHLORIDE 5 MG/1
5 TABLET ORAL AS NEEDED
Status: DISCONTINUED | OUTPATIENT
Start: 2021-06-23 | End: 2021-06-25

## 2021-06-23 RX ORDER — SODIUM CHLORIDE 9 MG/ML
INJECTION, SOLUTION INTRAVENOUS CONTINUOUS
Status: DISCONTINUED | OUTPATIENT
Start: 2021-06-23 | End: 2021-06-25

## 2021-06-23 RX ORDER — MIDODRINE HYDROCHLORIDE 5 MG/1
5 TABLET ORAL 3 TIMES DAILY
Status: DISCONTINUED | OUTPATIENT
Start: 2021-06-23 | End: 2021-06-23

## 2021-06-23 RX ORDER — SODIUM CHLORIDE 9 MG/ML
INJECTION, SOLUTION INTRAVENOUS
Status: ACTIVE | OUTPATIENT
Start: 2021-06-24 | End: 2021-06-24

## 2021-06-23 NOTE — PHYSICAL THERAPY NOTE
PHYSICAL THERAPY TREATMENT NOTE - INPATIENT     Room Number: 365/247-V       Presenting Problem: syncope    Problem List  Principal Problem:    Syncope, cardiogenic      PHYSICAL THERAPY ASSESSMENT   During this session the following PPE was worn by this t education; Family education;Gait training;Range of motion;Transfer training;Balance training;Strengthening;Stoop training;Stair training    SUBJECTIVE  \"I had another dizzy episode yesterday.  I feel fine now\"    OBJECTIVE  Precautions: Cardiac    WEIGHT B Patient's self-stated goal is: return to PLOF   Goal #1 Patient is able to demonstrate supine - sit EOB @ level: independent      Goal #1   Current Status  Independent supine>sit   Goal #2 Patient is able to demonstrate transfers Sit to/from Stand at [facility]

## 2021-06-23 NOTE — SLP NOTE
SPEECH DAILY NOTE - INPATIENT    ASSESSMENT & PLAN   ASSESSMENT  PPE: DROPLET MASK AND GLOVES. HAND SANITIZED UPON ENTRANCE/EXIT.      RN reports pt with new s/s stroke yesterday including R facial droop and weakness after angiogram - s/s have since subside posterior intracranial circulations, as well as the bilaterality of findings, an   embolic etiology is favored. 3. A punctate evolving acute/subacute lacunar infarct of the archana is redemonstrated.    4. Senescent changes of parenchymal volume loss with se

## 2021-06-23 NOTE — PAYOR COMM NOTE
--------------  CONTINUED STAY REVIEW    Payor: [address] [facility]  Subscriber #:  [id number]  Authorization Number: [id number]      Admit date: 6/19/21  Admit time: 11:59 PM    FAXING CLINICAL UPDATE FOR 6/23/21 6/23/21   Subjective:    Jackeline Vivas 140  --  139 137   K 4.1  --  3.9 3.9     --  107 107   CO2 26.0  --  25.0 24.0      Imaging:   MRA, HEAD (W+WO) (CPT=70546)     Result Date: 6/22/2021  CONCLUSION:  1.  Focal short segment severe high-grade stenosis of the basilar artery is redemonst anterior and posterior intracranial circulations, as well as the bilaterality of findings, an embolic etiology is favored. 3. A punctate evolving acute/subacute lacunar infarct of the archana is redemonstrated.   4. Senescent changes of parenchymal volume los mg     Date Action Dose Route     6/23/2021 0903 Given 325 mg Oral       atorvastatin (LIPITOR) tab 80 mg     Date Action Dose Route     6/22/2021 2127 Given 80 mg Oral       Clopidogrel Bisulfate (PLAVIX) tab 75 mg     Date Action Dose RoutUsere     6/23/

## 2021-06-23 NOTE — PROGRESS NOTES
Pt wasn't feeling right, soon after Dr. Yue Lynn arrived it was changed to Stroke Alert. Pt's wife Eladio Bach was present and attentive at bedside. Pt was generally asymptomatic upon neurologist's visit, but CT was ordered to check for changes.   Remained with

## 2021-06-23 NOTE — PROGRESS NOTES
Patient seen in follow up. Has some dizziness. No cp or sob. Had some R facial droop that resolved. No arm or leg weakness. Tele shows ventricular rhythm runs/ very slow VT at ~110.   Time spent > 35 min     06/23/21  0617   BP: 121/87   Pulse: 79 Subcutaneous, Q8H [address]  Clopidogrel Bisulfate (PLAVIX) tab 75 mg, 75 mg, Oral, Daily  glucose (DEX4) oral liquid 15 g, 15 g, Oral, Q15 Min PRN   Or  Glucose-Vitamin C (DEX-4) chewable tab 4 tablet, 4 tablet, Oral, Q15 Min PRN   Or  dextrose 50 % injection 5 CONCLUSION:  1. Multiple acute bilateral left greater than right cerebellar lacunar infarcts. 2. Tiny cortical and subcortical acute infarcts in right occipital lobe. No large acute cortical infarct. 3. Tiny acute lacunar infarcts in the inferior archana. embolic etiology. 2. A multitude of evolving acute bilateral cerebellar infarcts are redemonstrated, grossly similar in distribution to previous.  Given the involvement of the anterior and posterior intracranial circulations, as well as the bilaterality of

## 2021-06-23 NOTE — PROGRESS NOTES
[facility]  [address] [facility], [address] [place] [address]  [phone]        INPATIENT STROKE NEUROLOGY   FOLLOW UP PROGRESS NOTE  [facility]    Beulah Hampton Patient Status:  Inpatient     7/10/1960 MRN [id number] Plan    Diagnostics:  1. Imaging: MRI brain WO ; Stat three sequence MRI and MRA of the head. MRA of the head  will overestimate stenosis. 2.   Therapeutics:  1. Blood pressure goal: Permissive hypertension: Upper limit of goal per cardiology.  Otherwise noted that the corner of his right eyelid was somewhat slanted, but the most prominent feature was the right-sided facial droop. The patient had no other motor or sensory deficits. He was full strength throughout.  He then became diaphoretic, and reported d amplitude. .    Rapid movements: Rapid/fine movements are symmetric. As expected their dominant hand is slightly faster. Leg Drift:  None.  ke 5/5      Nonsustained clonus: Absent   Sustained clonus: Absent   - Sensory:   Light touch: normal    - [place] LIGHT GREEN    Collection Time: 06/19/21  8:42 PM   Result Value Ref Range    Hold Lt Green Auto Resulted    RAINBOW DRAW GOLD    Collection Time: 06/19/21  8:42 PM   Result Value Ref Range    Hold Gold Auto Resulted    Basic Metabolic Panel (8)    Collect Time: 06/19/21 10:33 PM    Specimen: Nares;  Other   Result Value Ref Range    Rapid SARS-CoV-2 by PCR Not Detected Not Detected   Basic Metabolic Panel (8)    Collection Time: 06/20/21  5:54 AM   Result Value Ref Range    Glucose 104 (H) 70 - 99 mg/dL    S Ref Range    POC Glucose  142 (H) 70 - 99 mg/dL   POCT Glucose    Collection Time: 06/21/21 12:39 AM   Result Value Ref Range    POC Glucose  102 (H) 70 - 99 mg/dL   Lipid Panel    Collection Time: 06/21/21  5:28 AM   Result Value Ref Range    Cholesterol, 53.0 %    MCV 98.9 80.0 - 100.0 fL    MCH 32.7 26.0 - 34.0 pg    MCHC 33.1 31.0 - 37.0 g/dL    RDW-SD 44.5 35.1 - 46.3 fL    RDW 12.3 11.0 - 15.0 %    .0 150.0 - 450.0 10(3)uL    Neutrophil Absolute Prelim 2.10 1.50 - 7.70 x10 (3) uL    Neutrophil A Result Date: 6/21/2021  CONCLUSION:  Coronary artery calcifications. Mild wall thickening of the distal esophagus at the gastroesophageal junction may be related to esophagitis.  If clinically indicated, further evaluation with direct visualization could b 10:51 by Nelda Muniz Performed an independent visualization of  Mri mra   Imaging revealed: multiple posterior circulation infarct including in the tip of the occipital lobe and throughout the cerebellum.  CTA demonstrates short segment of severe  bas

## 2021-06-23 NOTE — PLAN OF CARE
Pt. resting in bed throughout most of the shift. Pt. continues to complain of intermittent dizziness and weakness. Patient now requires x1 assistance and walker to get to bathroom due to dizziness. Neuro checks q4. IV fluids continued.  IV Zofran given for including physical limitations  - Instruct pt to call for assistance with activity based on assessment  - Modify environment to reduce risk of injury  - Provide assistive devices as appropriate  - Consider OT/PT consult to assist with strengthening/mobilit

## 2021-06-23 NOTE — PROGRESS NOTES
[facility]OSP - [facility]    Progress Note    Sumit Vizcarra Patient Status:  Inpatient    7/10/1960 MRN [id number]   Location [facility] 3W/SW Attending John Lemus MD   Hosp Day # 4 PCP None Pcp     CC: dizziness   Subjective:    Sumit Vizcarra Intravenous On Call   • Benzocaine  1 spray Mouth/Throat Once   • aspirin  300 mg Rectal Daily    Or   • aspirin  325 mg Oral Daily   • atorvastatin  80 mg Oral Nightly   • Heparin Sodium (Porcine)  5,000 Units Subcutaneous Q8H [address]   • Clopidogrel Bisulfat as termination of the right vertebral artery as PICA. 3. There is no hemodynamically significant stenosis or occlusion of the anterior intracranial circulation. 4. Fetal origin left posterior cerebral artery, a developmental variant.    5. Lesser incident Brooke Mackenzie MD on 6/21/2021 at 5:05 PM     Finalized by (CST): Brooke Mackenzie MD on 6/21/2021 at 5:17 PM          MRI BRAIN WO ACUTE (3) SEQUENCE (CPT=70551)    Result Date: 6/22/2021  CONCLUSION:  Limited 3-sequence stroke protocol study was pe circ 5% stenosis, proximal RCA 60-70% stenosis and 100%  distal RCA. S/p 1 KALEIGH to Lcx.   - cont DAPT  - cont metoprolol 100mg daily and lipitor 80mg daily   - dietitian to see  - cardiac rehab.    - cardiology following.   - tele.      Essential HTN   -

## 2021-06-23 NOTE — PROGRESS NOTES
Brief neurology progress note    MRI reviewed. Interval infarct in the right frontal lobe noted. Infarct in the anterior circulation is larger than one would expect from his cardiac cath, although that remains a possibility.   Suspect that this is an asym

## 2021-06-23 NOTE — SIGNIFICANT EVENT
RRT    *See RRT Documentation Record*    Reason the RRT was called: right facial droop and dizziness  Assessment of patient leading up to RRT:   HR: 111, BP: 169/112, SpO2: 99%.  Pt. diaphoretic with slight right facial droop, dizziness, and weakness    Int

## 2021-06-23 NOTE — DIABETES ED
[facility] - [facility]    Diabetes Education  Note    Travis Lee Patient Status:  Inpatient   7/10/1960 MRN [id number]  Location [facility] 3W/SW Attending Livan Dunlap MD  Hosp Day # 4 PCP None Pcp      Labs:    HgbA1C (%)   Date Value

## 2021-06-23 NOTE — PROGRESS NOTES
RRT/Stroke alert called for new onset R facial droop. Wife called RN to bedside as she noticed patient's R face was droopy. BUE and BLE equal , no numbness no tingling. R groin, R radial site CDI.  Patient then started c/o dizziness, and generalized wea

## 2021-06-23 NOTE — PLAN OF CARE
Patient is a/ox4, forgetful. RA. Standby assist. Accucheck ACHS. PT, OT, SLP worked with the patient today; see notes. Daily NIHSS. Neuro checks q4h. IVF @ 125 ml/hr. Plan is for a ADEEL tomorrow. Will continue to monitor.     Problem: Patient Centered Care appropriate  - Consider OT/PT consult to assist with strengthening/mobility  - Encourage toileting schedule  Outcome: Progressing     Problem: CARDIOVASCULAR - ADULT  Goal: Maintains optimal cardiac output and hemodynamic stability  Description: INTERVENTI results as appropriate  - Fluid restriction as ordered  - Instruct patient on fluid and nutrition restrictions as appropriate  Outcome: Progressing  Goal: Hemodynamic stability and optimal renal function maintained  Description: INTERVENTIONS:  - Monitor l Encourage visually impaired, hearing impaired and aphasic patients to use assistive/communication devices  Outcome: Progressing

## 2021-06-24 PROCEDURE — 99233 SBSQ HOSP IP/OBS HIGH 50: CPT | Performed by: HOSPITALIST

## 2021-06-24 RX ORDER — MIDODRINE HYDROCHLORIDE 5 MG/1
2.5 TABLET ORAL
Status: DISCONTINUED | OUTPATIENT
Start: 2021-06-24 | End: 2021-06-25

## 2021-06-24 NOTE — PROGRESS NOTES
Patient seen in follow up. No futher dizziness. No cp or sob.  Tele negative overnight for VT.     06/24/21  0456   BP: 138/80   Pulse: 72   Resp: 18   Temp: 98.6 °F (37 °C)       Intake/Output Summary (Last 24 hours) at 6/24/2021 0825  Last data fi PRN   Or  Glucose-Vitamin C (DEX-4) chewable tab 4 tablet, 4 tablet, Oral, Q15 Min PRN   Or  dextrose 50 % injection 50 mL, 50 mL, Intravenous, Q15 Min PRN   Or  glucose (DEX4) oral liquid 30 g, 30 g, Oral, Q15 Min PRN   Or  Glucose-Vitamin C (DEX-4) chewa interval development of extensive multifocal bilateral lacunar infarcts throughout the supratentorial white matter. The distribution of findings is suggestive of embolic etiology.   2. A multitude of evolving acute bilateral cerebellar infarcts are redemons

## 2021-06-24 NOTE — PAYOR COMM NOTE
--------------  CONTINUED STAY REVIEW    Payor: [address] [employer]  Subscriber #:  [id number]  Authorization Number: [id number]      Admit date: 6/19/21  Admit time: 11:59 PM    FAXING CLINICAL UPDATE FOR 6/24/21 6/24/21   Subjective:    Andree Olson infarcts in inferior archana. - neuro checks q4hrs x 24hrs. - orthostatic V/S negative. - cards following  - Neuro following.   - CTA with short segment of high grade stenosis basilar artery.    - 's goal < 100 prefrably < 70   - Allow permissive 100 mg Oral Benita Ch RN      Heparin Sodium (Porcine) 5000 UNIT/ML injection 5,000 Units     Date Action Dose Route User    6/24/2021 0500 Given 5,000 Units Subcutaneous (Left Lower Abdomen) Benita Ch RN    6/23/2021 2132 Given 5,000 Units Subcutane

## 2021-06-24 NOTE — PROGRESS NOTES
[facility]    Progress Note    Pemberton Lawrence Patient Status:  Inpatient    7/10/1960 MRN [id number]   Location [facility] 3W/SW Attending Clifton Hutchinson MD   Hosp Day # 5 PCP None Pcp     CC: dizziness   Subjective:    Merissa Bravo Oral Nightly   • Heparin Sodium (Porcine)  5,000 Units Subcutaneous Q8H [facility] & assisted   • Clopidogrel Bisulfate  75 mg Oral Daily   • Insulin Aspart Pen  1-5 Units Subcutaneous TID CC and HS   • Pantoprazole Sodium  40 mg Oral QAM AC   • carbamide peroxide  5 drop L developmental variant. 5. Lesser incidental findings as above.     Dictated by (CST): Debby London MD on 6/22/2021 at 8:49 PM     Finalized by (CST): Debby London MD on 6/22/2021 at 9:00 PM          MRI BRAIN WO ACUTE (3) SEQUENCE (CPT=70551)    Res Rt facial droop and dizziness - repeat MRI as above  - no need for ADEEL.   - d/w neuro will start midodrine 2.5mg BID to maintain higher pressures. CAD   - s/p [facility] 6/22 Prox circ 5% stenosis, proximal RCA 60-70% stenosis and 100%  distal RCA.  S/p 1 D

## 2021-06-24 NOTE — PHYSICAL THERAPY NOTE
PHYSICAL THERAPY TREATMENT NOTE - INPATIENT     Room Number: 689/887-M       Presenting Problem: syncope    Problem List  Principal Problem:    Syncope, cardiogenic  Active Problems:    Basilar artery stenosis    Stroke due to stenosis of basilar artery (H training;Strengthening;Stoop training;Stair training;Transfer training;Balance training    SUBJECTIVE  Pt was agreeable to therapy session.      OBJECTIVE  Precautions: Cardiac    WEIGHT BEARING RESTRICTION  Weight Bearing Restriction: None                P none      Goal #2  Current Status SBA with RW   Goal #3 Patient is able to ambulate 300 feet with assist device: none at assistance level: supervision   Goal #3   Current Status 500' with the RW SBA    Goal #4 Patient will negotiate 5 stairs/one curb w/ as

## 2021-06-24 NOTE — PLAN OF CARE
CONTINUING IV FLUIDS, CLEARED BY CARDIOLOGY TO GO HOME, PLAN TO DISCHARGE HOME WHEN OK WITH NEUROLOGY    Problem: Patient Centered Care  Goal: Patient preferences are identified and integrated in the patient's plan of care  Description: Interventions:  - W Progressing     Problem: CARDIOVASCULAR - ADULT  Goal: Maintains optimal cardiac output and hemodynamic stability  Description: INTERVENTIONS:  - Monitor vital signs, rhythm, and trends  - Monitor for bleeding, hypotension and signs of decreased cardiac ou labs, urine output, blood pressure (other measures as available)  - Encourage oral intake as appropriate  - Instruct patient on fluid and nutrition restrictions as appropriate  Outcome: Progressing     Problem: NEUROLOGICAL - ADULT  Goal: Achieves stable o patient on self management of diabetes  Outcome: Completed

## 2021-06-24 NOTE — PROGRESS NOTES
Patient does not need ADEEL. Discussed with cardiology and primary attending yesterday. Plan is for permissive hypertension and continue midodrine. Recurrent symptoms are due to stuttering small vessel stroke in the brainstem.   They are not due to recurre

## 2021-06-24 NOTE — PROGRESS NOTES
[facility]  [address] [facility], [address] [place] [address]  [phone]        INPATIENT STROKE NEUROLOGY   FOLLOW UP PROGRESS NOTE  [facility] Gregs Patient Status:  Inpatient     7/10/1960 MRN [id number] permissive hypertension including blood pressure augmentation with  IV fluids and midodrine, and potentially holding antihypertensives.   This is in order to maximize flow through the branch vessels from the basilar artery while collateral vessels are forme stenosis       Plan    Diagnostics: None  Therapeutics:  1. Blood pressure goal: Permissive hypertension: Goal is 160-220.   a. Please avoid blood pressure variability. wide fluctuations in BP can lead to stroke expansion.     b. Nina Soles not recommend lowering t facial droop. The patient had no other motor or sensory deficits. He was full strength throughout. He then became diaphoretic, and reported diffuse weakness. RRT was called.  10 minutes later, patient's exam was nonfocal. Stat MRA MRI pending  • 06/23/2021: midline. No atrophy or fasiculations of the tongue noted. Palate and uvula elevate symmetrically. Shoulder shrug symmetric.    - Motor:  normal tone, normal bulk.      Pronator drift: No pronator drift   Finger Taps: Finger taps are symmetric in rate and a hour(s))   POCT Glucose    Collection Time: 06/21/21  8:10 AM   Result Value Ref Range    POC Glucose  95 70 - 99 mg/dL   POCT Glucose    Collection Time: 06/21/21 11:24 AM   Result Value Ref Range    POC Glucose  88 70 - 99 mg/dL   POCT Glucose    Collect Basophil Absolute 0.02 0.00 - 0.20 x10(3) uL    Immature Granulocyte Absolute 0.01 0.00 - 1.00 x10(3) uL    Neutrophil % 43.2 %    Lymphocyte % 45.9 %    Monocyte % 8.2 %    Eosinophil % 2.1 %    Basophil % 0.4 %    Immature Granulocyte % 0.2 %   POCT Gluc g/dL    RDW-SD 44.7 35.1 - 46.3 fL    RDW 12.1 11.0 - 15.0 %    .0 150.0 - 450.0 10(3)uL    Neutrophil Absolute Prelim 5.42 1.50 - 7.70 x10 (3) uL    Neutrophil Absolute 5.42 1.50 - 7.70 x10(3) uL    Lymphocyte Absolute 1.71 1.00 - 4.00 x10(3) uL 150.0 - 450.0 10(3)uL    Neutrophil Absolute Prelim 2.48 1.50 - 7.70 x10 (3) uL    Neutrophil Absolute 2.48 1.50 - 7.70 x10(3) uL    Lymphocyte Absolute 1.64 1.00 - 4.00 x10(3) uL    Monocyte Absolute 0.46 0.10 - 1.00 x10(3) uL    Eosinophil Absolute 0.08 bilateral cerebellar infarcts are redemonstrated, grossly similar in distribution to previous. Given the involvement of the anterior and posterior intracranial circulations, as well as the bilaterality of findings, an embolic etiology is favored.   3. A pun

## 2021-06-24 NOTE — PLAN OF CARE
Pt alert. Neuro q4 hr. On ivf 0.9 @100 ml/hr. Npo for the night. Possible ADEEL. PER PT stated Dr. Jennyfer Muir said it will be canceled. Wife at bed side. Call ligth within reach.    Problem: Patient Centered Care  Goal: Patient preferences are identified and inte strengthening/mobility  - Encourage toileting schedule  Outcome: Progressing     Problem: CARDIOVASCULAR - ADULT  Goal: Maintains optimal cardiac output and hemodynamic stability  Description: INTERVENTIONS:  - Monitor vital signs, rhythm, and trends  - Mo assistive/communication devices  Outcome: Progressing     Problem: Diabetes/Glucose Control  Goal: Glucose maintained within prescribed range  Description: INTERVENTIONS:  - Monitor Blood Glucose as ordered  - Assess for signs and symptoms of hyperglycemia

## 2021-06-24 NOTE — PROGRESS NOTES
Discussed with neurology regarding proceeding with cardiac cath. Agreed we need to proceed due to severe lesion of circumflex which has significant soft plaque noted making this lesion high risk for rupture.  There is also a possibly high grade lesion of th

## 2021-06-25 VITALS
SYSTOLIC BLOOD PRESSURE: 139 MMHG | HEART RATE: 77 BPM | RESPIRATION RATE: 16 BRPM | WEIGHT: 308.13 LBS | DIASTOLIC BLOOD PRESSURE: 85 MMHG | BODY MASS INDEX: 39.55 KG/M2 | HEIGHT: 74 IN | TEMPERATURE: 99 F | OXYGEN SATURATION: 94 %

## 2021-06-25 PROCEDURE — 99239 HOSP IP/OBS DSCHRG MGMT >30: CPT | Performed by: HOSPITALIST

## 2021-06-25 PROCEDURE — 99231 SBSQ HOSP IP/OBS SF/LOW 25: CPT | Performed by: OTHER

## 2021-06-25 RX ORDER — CLOPIDOGREL BISULFATE 75 MG/1
75 TABLET ORAL DAILY
Qty: 30 TABLET | Refills: 0 | Status: SHIPPED | OUTPATIENT
Start: 2021-06-26

## 2021-06-25 RX ORDER — POTASSIUM CHLORIDE 20 MEQ/1
40 TABLET, EXTENDED RELEASE ORAL ONCE
Status: COMPLETED | OUTPATIENT
Start: 2021-06-25 | End: 2021-06-25

## 2021-06-25 RX ORDER — ATORVASTATIN CALCIUM 80 MG/1
80 TABLET, FILM COATED ORAL NIGHTLY
Qty: 90 TABLET | Refills: 3 | Status: SHIPPED | OUTPATIENT
Start: 2021-06-25 | End: 2022-06-25

## 2021-06-25 RX ORDER — METOPROLOL SUCCINATE 100 MG/1
100 TABLET, EXTENDED RELEASE ORAL
Status: DISCONTINUED | OUTPATIENT
Start: 2021-06-26 | End: 2021-06-25

## 2021-06-25 RX ORDER — ASPIRIN 81 MG/1
81 TABLET ORAL DAILY
Qty: 90 TABLET | Refills: 3 | Status: SHIPPED | OUTPATIENT
Start: 2021-06-25 | End: 2022-06-25

## 2021-06-25 RX ORDER — METOPROLOL SUCCINATE 100 MG/1
100 TABLET, EXTENDED RELEASE ORAL
Qty: 30 TABLET | Refills: 0 | Status: SHIPPED | OUTPATIENT
Start: 2021-06-26

## 2021-06-25 RX ORDER — METOPROLOL SUCCINATE 100 MG/1
200 TABLET, EXTENDED RELEASE ORAL
Status: DISCONTINUED | OUTPATIENT
Start: 2021-06-26 | End: 2021-06-25

## 2021-06-25 NOTE — PLAN OF CARE
Neuro q shift. No IV fliuds, no IV in place. No neurological changes. No cardiac abnormalities through the night. Call light within reach.    Problem: Patient Centered Care  Goal: Patient preferences are identified and integrated in the patient's plan of ca toileting schedule  Outcome: Progressing     Problem: CARDIOVASCULAR - ADULT  Goal: Maintains optimal cardiac output and hemodynamic stability  Description: INTERVENTIONS:  - Monitor vital signs, rhythm, and trends  - Monitor for bleeding, hypotension and

## 2021-06-25 NOTE — PROGRESS NOTES
Patient seen in follow up. No futher dizziness. No cp or sob. Tele negative overnight for VT.  Yesterday evening 6 beat but a lot better than before when he was having runs and runs of VT.     06/25/21  0518   BP: (!) 126/98   Pulse: 74   Resp: 20 Subcutaneous, Q8H [address]  Clopidogrel Bisulfate (PLAVIX) tab 75 mg, 75 mg, Oral, Daily  glucose (DEX4) oral liquid 15 g, 15 g, Oral, Q15 Min PRN   Or  Glucose-Vitamin C (DEX-4) chewable tab 4 tablet, 4 tablet, Oral, Q15 Min PRN   Or  dextrose 50 % injection 5

## 2021-06-25 NOTE — PROGRESS NOTES
[facility]  [address] [facility], [address] [place] [address]  [phone]        INPATIENT STROKE NEUROLOGY   FOLLOW UP PROGRESS NOTE  [facility] Gathers Patient Status:  Inpatient     7/10/1960 MRN [id number] Patient is remained asymptomatic for the last 2 days, however his blood pressures did not improve significantly. 1. Severe mid basilar stenosis, multiple posterior circulation infarcts, recurrent episodes of dizziness, diaphoresis? Right facial droop deficits. He was full strength throughout. He then became diaphoretic, and reported diffuse weakness. RRT was called. 10 minutes later, patient's exam was nonfocal. Stat MRA MRI pending  • 06/23/2021: event free. • 06/25/21: Remained event free.   Mulu normal bulk. Pronator drift: No pronator drift   Finger Taps: Finger taps are symmetric in rate and amplitude. .    Rapid movements: Rapid/fine movements are symmetric. As expected their dominant hand is slightly faster. Leg Drift:  None.  ke 5/5 Range    POC Glucose  87 70 - 99 mg/dL   POCT Glucose    Collection Time: 06/22/21  8:45 PM   Result Value Ref Range    POC Glucose  128 (H) 70 - 99 mg/dL   Basic Metabolic Panel (8)    Collection Time: 06/23/21  4:52 AM   Result Value Ref Range    Glucose Time: 06/23/21  8:53 PM   Result Value Ref Range    POC Glucose  122 (H) 70 - 99 mg/dL   Basic Metabolic Panel (8)    Collection Time: 06/24/21  4:48 AM   Result Value Ref Range    Glucose 95 70 - 99 mg/dL    Sodium 141 136 - 145 mmol/L    Potassium 4.2 3. - 15.0 %    RDW-SD 43.9 35.1 - 46.3 fL    .0 150.0 - 450.0 75(8)LS   Basic Metabolic Panel (8)    Collection Time: 06/25/21  5:22 AM   Result Value Ref Range    Glucose 101 (H) 70 - 99 mg/dL    Sodium 139 136 - 145 mmol/L    Potassium 3.8 3.5 - 5.1

## 2021-06-25 NOTE — PHYSICAL THERAPY NOTE
PHYSICAL THERAPY TREATMENT NOTE - INPATIENT     Room Number: 517/655-J       Presenting Problem: syncope    Problem List  Principal Problem:    Syncope, cardiogenic  Active Problems:    Basilar artery stenosis    Stroke due to stenosis of basilar artery (H Techniques: Activity promotion; Body mechanics; Relaxation;Repositioning    BALANCE                                                                                                                       Static Sitting: Normal  Dynamic Sitting: Good Current Status Negotiated 12 stairs with 1 HR CGA/SBA   Goal #5 Patient to demonstrate independence with home activity/exercise instructions provided to patient in preparation for discharge.    Goal #5   Current Status IN PROGRESS

## 2021-06-25 NOTE — PLAN OF CARE
Patient has been medically cleared for discharge. I reviewed discharge instructions with patient and wife at bedside including medication changes and necessary follow up visits. They express their understanding.  [id number] letter printed and sent with affect risk of falls.   - [place] fall precautions as indicated by assessment.  - Educate pt/family on patient safety including physical limitations  - Instruct pt to call for assistance with activity based on assessment  - Modify environment to reduce ri appropriate  Outcome: Completed  Goal: Hemodynamic stability and optimal renal function maintained  Description: INTERVENTIONS:  - Monitor labs and assess for signs and symptoms of volume excess or deficit  - Monitor intake, output and patient weight  - Mo

## 2021-06-25 NOTE — PAYOR COMM NOTE
--------------  CONTINUED STAY REVIEW    Payor: [facility]  Subscriber #:  [id number]  Authorization Number: [id number]      Admit date: 6/19/21  Admit time: 11:59 PM    Admitting Physician: Brett Nielsen MD  Attending Physician:  Andrews Salazar Or  Metoclopramide HCl (REGLAN) injection 10 mg, 10 mg, Intravenous, Q8H PRN  aspirin 300 MG rectal suppository 300 mg, 300 mg, Rectal, Daily   Or  aspirin tab 325 mg, 325 mg, Oral, Daily  atorvastatin (LIPITOR) tab 80 mg, 80 mg, Oral, Nightly  Heparin Sod bridging collaterals.     Ischemic VT. S/p LCx stent. Appears improved.      PLAN:     Keep same cardiac meds except at this point I would say DC midodrine if ok with neurology.   OK to dc from cardiology.     Manas Reilly DO [facility]  [address] [facility] Dose Route User    6/25/2021 0859 Given 40 mEq Oral Vito Srinivasan, RN

## 2021-06-29 ENCOUNTER — TELEPHONE (OUTPATIENT)
Dept: NEUROLOGY | Facility: CLINIC | Age: 61
End: 2021-06-29

## 2021-06-29 NOTE — TELEPHONE ENCOUNTER
Patient was hospitalized on 6/20/21. Received Health Care Provider Medical Certification form. Form filled out and placed on Dr. Germán Parish desgautam for review and signature. Per previous notes, patient to remain off work until he is seen in office on 7/29/21.

## 2021-06-30 NOTE — TELEPHONE ENCOUNTER
Dr Anya Pollock completed [facility] provider medical certification form. Faxed to [phone] per patient written direction. Copy mailed to home address. Original to scanning. Spoke to patient and wife and advised of above.  States they have also sent

## 2021-06-30 NOTE — DISCHARGE SUMMARY
[facility] HOSPITALIST  DISCHARGE SUMMARY     Denis Ross Patient Status:  Inpatient    7/10/1960 MRN [id number]   [facility] 3W/SW Attending No att. providers found   [facility] Day # 6 PCP None Pcp     DATE OF ADMISSION: 2021  DATE OF DI HEENT: NCAT, neck supple, no carotid bruit. CV: RRR, S1S2, and intact distal pulses. No gallop, rub, murmur. Pulm: Effort and breath sounds normal. No distress, wheezes, rales, rhonchi. Abd: Soft, NTND, BS normal, no mass, no HSM, no rebound/guarding. Physician  Cardiovascular Diseases          FOLLOW UP:  Teetee Sin DO  [address] [facility]. [address]  [phone]    In 4 weeks      Kraig Villa MD  [address] [place] [id number] [phone]    In 1 week      Michael Dailey

## 2021-06-30 NOTE — TELEPHONE ENCOUNTER
Spoke to wife. Form physician mutual received via fax with patient authorization to release information to physician mutual.   Form initiated, placed on Dr Avila Yakut desk for completion and signature.

## 2021-07-01 ENCOUNTER — MED REC SCAN ONLY (OUTPATIENT)
Dept: NEUROLOGY | Facility: CLINIC | Age: 61
End: 2021-07-01

## 2021-07-01 NOTE — TELEPHONE ENCOUNTER
Spoke to wife, confirmed fax # for [employer] Physician form. Form completed and signed by Dr Henok Felder. Faxed to [phone]. Copy to scanning. Original mailed to patient home address.

## 2021-07-15 ENCOUNTER — OFFICE VISIT (OUTPATIENT)
Dept: INTERNAL MEDICINE CLINIC | Facility: CLINIC | Age: 61
End: 2021-07-15
Payer: COMMERCIAL

## 2021-07-15 VITALS
OXYGEN SATURATION: 99 % | TEMPERATURE: 98 F | HEART RATE: 77 BPM | SYSTOLIC BLOOD PRESSURE: 118 MMHG | RESPIRATION RATE: 18 BRPM | HEIGHT: 74 IN | DIASTOLIC BLOOD PRESSURE: 72 MMHG | BODY MASS INDEX: 36.19 KG/M2 | WEIGHT: 282 LBS

## 2021-07-15 DIAGNOSIS — Z12.5 PROSTATE CANCER SCREENING: ICD-10-CM

## 2021-07-15 DIAGNOSIS — I25.118 CORONARY ARTERY DISEASE OF NATIVE ARTERY OF NATIVE HEART WITH STABLE ANGINA PECTORIS (HCC): ICD-10-CM

## 2021-07-15 DIAGNOSIS — Z00.00 ENCOUNTER FOR PREVENTIVE CARE: ICD-10-CM

## 2021-07-15 DIAGNOSIS — Z00.00 ANNUAL PHYSICAL EXAM: Primary | ICD-10-CM

## 2021-07-15 DIAGNOSIS — I63.9 CEREBROVASCULAR ACCIDENT (CVA), UNSPECIFIED MECHANISM (HCC): ICD-10-CM

## 2021-07-15 LAB
BILIRUB UR QL: NEGATIVE
CLARITY UR: CLEAR
COLOR UR: YELLOW
COMPLEXED PSA SERPL-MCNC: 1.31 NG/ML (ref ?–4)
GLUCOSE UR-MCNC: NEGATIVE MG/DL
HGB UR QL STRIP.AUTO: NEGATIVE
KETONES UR-MCNC: NEGATIVE MG/DL
LEUKOCYTE ESTERASE UR QL STRIP.AUTO: NEGATIVE
NITRITE UR QL STRIP.AUTO: NEGATIVE
PH UR: 6 [PH] (ref 5–8)
PROT UR-MCNC: NEGATIVE MG/DL
SP GR UR STRIP: 1.02 (ref 1–1.03)
UROBILINOGEN UR STRIP-ACNC: <2

## 2021-07-15 PROCEDURE — 3078F DIAST BP <80 MM HG: CPT | Performed by: INTERNAL MEDICINE

## 2021-07-15 PROCEDURE — 3074F SYST BP LT 130 MM HG: CPT | Performed by: INTERNAL MEDICINE

## 2021-07-15 PROCEDURE — 3008F BODY MASS INDEX DOCD: CPT | Performed by: INTERNAL MEDICINE

## 2021-07-15 PROCEDURE — 99396 PREV VISIT EST AGE 40-64: CPT | Performed by: INTERNAL MEDICINE

## 2021-07-15 PROCEDURE — 36415 COLL VENOUS BLD VENIPUNCTURE: CPT | Performed by: INTERNAL MEDICINE

## 2021-07-15 NOTE — PROGRESS NOTES
HPI/Subjective:     Patient ID: Preet Mace is a 59year old male. HPI  Patient first time to establish care recently was in the hospital with symptoms of dizziness he was found to have acute stroke likely no residual effect.   Patient also had a stre Allergies    No past medical history on file. No past surgical history on file. No family history on file.    Social History: Social History    Tobacco Use      Smoking status: Never Smoker      Smokeless tobacco: Never Used    Alcohol use: Not on file and colonoscopy    Orders Placed This Encounter      PSA (Screening) [E]      Urinalysis, Routine      Meds This Visit:  Requested Prescriptions      No prescriptions requested or ordered in this encounter       Imaging & Referrals:  OPHTHALMOLOGY - INTERN

## 2021-07-18 ENCOUNTER — TELEPHONE (OUTPATIENT)
Dept: INTERNAL MEDICINE CLINIC | Facility: CLINIC | Age: 61
End: 2021-07-18

## 2021-07-18 ENCOUNTER — APPOINTMENT (OUTPATIENT)
Dept: GENERAL RADIOLOGY | Facility: HOSPITAL | Age: 61
End: 2021-07-18
Attending: EMERGENCY MEDICINE
Payer: COMMERCIAL

## 2021-07-18 ENCOUNTER — HOSPITAL ENCOUNTER (EMERGENCY)
Facility: HOSPITAL | Age: 61
Discharge: HOME | End: 2021-07-18
Attending: EMERGENCY MEDICINE
Payer: COMMERCIAL

## 2021-07-18 VITALS
OXYGEN SATURATION: 98 % | SYSTOLIC BLOOD PRESSURE: 130 MMHG | BODY MASS INDEX: 36.32 KG/M2 | WEIGHT: 283 LBS | DIASTOLIC BLOOD PRESSURE: 87 MMHG | TEMPERATURE: 99 F | RESPIRATION RATE: 20 BRPM | HEIGHT: 74 IN | HEART RATE: 66 BPM

## 2021-07-18 DIAGNOSIS — R07.89 CHEST WALL PAIN: Primary | ICD-10-CM

## 2021-07-18 DIAGNOSIS — S92.414A CLOSED NONDISPLACED FRACTURE OF PROXIMAL PHALANX OF RIGHT GREAT TOE, INITIAL ENCOUNTER: ICD-10-CM

## 2021-07-18 LAB
ANION GAP SERPL CALC-SCNC: 5 MMOL/L (ref 0–18)
BASOPHILS # BLD AUTO: 0.02 X10(3) UL (ref 0–0.2)
BASOPHILS NFR BLD AUTO: 0.5 %
BUN BLD-MCNC: 9 MG/DL (ref 7–18)
BUN/CREAT SERPL: 8.3 (ref 10–20)
CALCIUM BLD-MCNC: 9.6 MG/DL (ref 8.5–10.1)
CHLORIDE SERPL-SCNC: 110 MMOL/L (ref 98–112)
CO2 SERPL-SCNC: 27 MMOL/L (ref 21–32)
CREAT BLD-MCNC: 1.09 MG/DL
DEPRECATED RDW RBC AUTO: 41.1 FL (ref 35.1–46.3)
EOSINOPHIL # BLD AUTO: 0.11 X10(3) UL (ref 0–0.7)
EOSINOPHIL NFR BLD AUTO: 2.8 %
ERYTHROCYTE [DISTWIDTH] IN BLOOD BY AUTOMATED COUNT: 11.6 % (ref 11–15)
GLUCOSE BLD-MCNC: 128 MG/DL (ref 70–99)
HCT VFR BLD AUTO: 40.4 %
HGB BLD-MCNC: 13.5 G/DL
IMM GRANULOCYTES # BLD AUTO: 0 X10(3) UL (ref 0–1)
IMM GRANULOCYTES NFR BLD: 0 %
LYMPHOCYTES # BLD AUTO: 1.64 X10(3) UL (ref 1–4)
LYMPHOCYTES NFR BLD AUTO: 41.7 %
MCH RBC QN AUTO: 32.5 PG (ref 26–34)
MCHC RBC AUTO-ENTMCNC: 33.4 G/DL (ref 31–37)
MCV RBC AUTO: 97.1 FL
MONOCYTES # BLD AUTO: 0.27 X10(3) UL (ref 0.1–1)
MONOCYTES NFR BLD AUTO: 6.9 %
NEUTROPHILS # BLD AUTO: 1.89 X10 (3) UL (ref 1.5–7.7)
NEUTROPHILS # BLD AUTO: 1.89 X10(3) UL (ref 1.5–7.7)
NEUTROPHILS NFR BLD AUTO: 48.1 %
OSMOLALITY SERPL CALC.SUM OF ELEC: 294 MOSM/KG (ref 275–295)
PLATELET # BLD AUTO: 204 10(3)UL (ref 150–450)
POTASSIUM SERPL-SCNC: 4 MMOL/L (ref 3.5–5.1)
RBC # BLD AUTO: 4.16 X10(6)UL
SODIUM SERPL-SCNC: 142 MMOL/L (ref 136–145)
TROPONIN I SERPL-MCNC: <0.045 NG/ML (ref ?–0.04)
TROPONIN I SERPL-MCNC: <0.045 NG/ML (ref ?–0.04)
WBC # BLD AUTO: 3.9 X10(3) UL (ref 4–11)

## 2021-07-18 PROCEDURE — 85025 COMPLETE CBC W/AUTO DIFF WBC: CPT | Performed by: EMERGENCY MEDICINE

## 2021-07-18 PROCEDURE — 80048 BASIC METABOLIC PNL TOTAL CA: CPT | Performed by: EMERGENCY MEDICINE

## 2021-07-18 PROCEDURE — 71045 X-RAY EXAM CHEST 1 VIEW: CPT | Performed by: EMERGENCY MEDICINE

## 2021-07-18 PROCEDURE — 96374 THER/PROPH/DIAG INJ IV PUSH: CPT

## 2021-07-18 PROCEDURE — 84484 ASSAY OF TROPONIN QUANT: CPT | Performed by: EMERGENCY MEDICINE

## 2021-07-18 PROCEDURE — 99285 EMERGENCY DEPT VISIT HI MDM: CPT

## 2021-07-18 PROCEDURE — 73660 X-RAY EXAM OF TOE(S): CPT | Performed by: EMERGENCY MEDICINE

## 2021-07-18 PROCEDURE — 93010 ELECTROCARDIOGRAM REPORT: CPT | Performed by: EMERGENCY MEDICINE

## 2021-07-18 PROCEDURE — 93005 ELECTROCARDIOGRAM TRACING: CPT

## 2021-07-18 RX ORDER — KETOROLAC TROMETHAMINE 15 MG/ML
15 INJECTION, SOLUTION INTRAMUSCULAR; INTRAVENOUS ONCE
Status: COMPLETED | OUTPATIENT
Start: 2021-07-18 | End: 2021-07-18

## 2021-07-18 NOTE — ED PROVIDER NOTES
Patient Seen in: [facility] AND [facility] Emergency Department      History   Patient presents with:  Chest Pain    Stated Complaint: Chest Pain after stent surgery    HPI/Subjective:   HPI    25-year-old male with recent admission for syncope and ultimately se 131/90   Pulse 68   Temp 98.8 °F (37.1 °C) (Oral)   Resp 24   Ht 188 cm (6' 2\")   Wt 128.4 kg   SpO2 95%   BMI 36.34 kg/m²         Physical Exam    Constitutional: Oriented to person, place, and time. Appears well-developed. No distress.    Head: Normocep Platelet.   Procedure                               Abnormality         Status                     ---------                               -----------         ------                     CBC W/ DIFFERENTIAL[[id number]]          Abnormal            Final resul Index Registry. FINDINGS:  CSF SPACES: Ventricles, cisterns, and sulci are appropriate for age. No hydrocephalus, subarachnoid hemorrhage, or mass. No midline shift. CEREBRUM: No edema, hemorrhage, mass, acute infarction, or significant atrophy.   WHITE artifact. ANTERIOR CIRCULATION: INTERNAL CAROTIDS: Flow identified. No significant stenosis. ANTERIOR CEREBRALS: Flow identified. No significant stenosis. MIDDLE CEREBRALS: Flow identified. No significant stenosis. ANTERIOR COMMUNICATOR: Flow identified. Jenni Anders MD on 6/22/2021 at 8:49 PM     Finalized by (CST): Jenni Anders MD on 6/22/2021 at 9:00 PM          MRI BRAIN (CPT=70551)    Result Date: 6/21/2021  PROCEDURE: MRI BRAIN (CPT=70551)  COMPARISON: [facility], CT BRAIN OR HE hours  COMPARISON: None. INDICATIONS: Chest pain x 1 day. History of stent placed 6/22/21. TECHNIQUE:   Single view. FINDINGS:  CARDIAC/VASC: Cardiomegaly Unremarkable pulmonary vasculature.   MEDIAST/ASHLEY:   Atherosclerotic aorta with no visible aneury Mild wall thickening of the distal esophagus at the gastroesophageal junction may be related to esophagitis. If clinically indicated, further evaluation with direct visualization could be performed. CT cardiac over read.  Please see dedicated report by the in the carotid bifurcation. No hemodynamically significant stenosis or dissection. The internal carotid artery is redundant and courses through the retropharynx. LEFT COMMON CAROTID: No hemodynamically significant stenosis or dissection.  LEFT INTERNAL C arteries bilaterally. 3. No flow limiting stenosis or occlusion of the anterior intracranial arteries.  4. Non dominant right vertebral artery terminates in the PICA and there is fetal origin of the left posterior cerebral artery, which are developmental va data:  No prior study was available for comparison. Study status:  Elective. Procedure:  Transthoracic echocardiography. The study was technically limited due to body habitus.  Scanning was performed from the parasternal, apical, and subcostal acoustic wi Tricuspid valve:   Structurally normal valve. Mobility was not restricted. Doppler: There was no evidence for stenosis. Mild regurgitation.  Pulmonary artery:   The right ventricular systolic pressure was increased consistent with mild pulmonary hyp 87    ml     ---------  Stroke index (SV/bsa), LVOT DP              34    ml/m^2 ---------   Aorta                                       Value        Reference  Aortic root ID                              4.0   cm     &lt;4.6  Ascending aorta ID, A-P, [facility], [address] [place] [address], [address] (UKX=[id number]), 6/21/2021, 4:27 PM.  [facility], MRI BRAIN (QHV=[id number]), 6/21/2021, 2:11 PM.  INDICATIONS: Posterior circulation infarcts; focal psychomotor deficit with acute-onset right facial droop an SINUSES: Limited views demonstrate no significant mucosal thickening or fluid. ORBITS: Limited views are grossly unremarkable. OTHER: The flow voids of the major intracranial vessels are visualized.                CONCLUSION:  Limited 3-sequence stroke p ([employer] of [employer]) NRDR ([address]) which includes the Dose Index Registry.  STUDY QUALITY:  Fair with the proximal two-thirds of the coronary arteries seen LIMITATIONS:   None EXAM FORM: CT Scanner:   The Karsten fleming [place] On demonstrate no stenosis or plaque. Circumflex is normal size. There are 2 major marginal branches. Circumflex demonstrates proximal calcified and non calcified plaque possibly high grade lesion in the area. Distally not well seen.  Marginal branches demon comfortable with this plan. We will continue his medication management. They will follow-up with cardiology and the primary doctor and return with any worsening or change.                              Disposition and Plan     Clinical Impression:  Chest w

## 2021-07-18 NOTE — ED INITIAL ASSESSMENT (HPI)
Patient c/o Macel Numbers left chest pain\" since yesterday. Denies dizziness, shortness of breath. Stent placed 6/22/21.

## 2021-07-20 ENCOUNTER — OFFICE VISIT (OUTPATIENT)
Dept: SURGERY | Facility: CLINIC | Age: 61
End: 2021-07-20
Payer: COMMERCIAL

## 2021-07-20 VITALS — DIASTOLIC BLOOD PRESSURE: 88 MMHG | HEART RATE: 60 BPM | SYSTOLIC BLOOD PRESSURE: 126 MMHG

## 2021-07-20 DIAGNOSIS — I65.1 BASILAR ARTERY STENOSIS: Primary | ICD-10-CM

## 2021-07-20 PROCEDURE — 3079F DIAST BP 80-89 MM HG: CPT

## 2021-07-20 PROCEDURE — 3074F SYST BP LT 130 MM HG: CPT

## 2021-07-20 PROCEDURE — 99244 OFF/OP CNSLTJ NEW/EST MOD 40: CPT

## 2021-07-20 NOTE — PROGRESS NOTES
Patient here for evaluation with Dr. Lottie Key.       Review of Systems:    Hand Dominance: right  General: no symptoms reported  Neuro: no symptoms reported  Head: no symptoms reported  Musculoskeletal: no symptoms reported  Cardiovascular: chest pain and regina

## 2021-07-20 NOTE — TELEPHONE ENCOUNTER
Message # [phone]         07/18/2021 08:35a   [NATALIIA]  To:  From:  NOE Amaro MD:  Phone#:  ----------------------------------------------------------------------  OSVALDO ELKINS [birth year] STENT PUT IN 6/21 HURTING WHEN LIFTING ARM OR TURNING TO THE LEF

## 2021-07-20 NOTE — PROGRESS NOTES
[facility]  Interventional Neuroradiology Clinic Note      Alfred Collins DO  Neurology  [facility]    Abdulaziz Goodman MD  Primary Care      Date of Service: 7/20/2021    Dear Claudia Ballard,     We had the pleasure of seeing Travis Lee angioplasty/stenting if required. Review of Systems:  +recent ER visit 7/18 with recurrent chest pain, unknown etiology  The patient denies associated headaches, neck pain,current  nausea, or vomiting.   The patient denies cranial nerve symptoms such as intelligence, memory, language, and judgement. On cranial nerve examination, full visual fields are noted  by confrontational testing. The patient's pupils are equally responsive and reactive to light. Extraocular muscles are intact.   There is no eviden medical management.   [facility] trial that studied aggressive medical management versus intracranial angioplasty/stenting for symptomatic ICAD plaques > 70% stenosis and significantly favored medical management as the primary option due to increased adverse clinical sequela, but will defer to the Stroke Neurology service unless they prefer we also follow this patient in the long-term.   If the patient develops any refractory symptoms attributable to the tibial basilar intracranial stenosis, we will urgently re

## 2021-07-27 ENCOUNTER — TELEPHONE (OUTPATIENT)
Dept: SURGERY | Facility: CLINIC | Age: 61
End: 2021-07-27

## 2021-07-27 NOTE — TELEPHONE ENCOUNTER
Per OV note on 7-20-21 \"ollow-up MRI/MRA study in 1 year may be helpful to assess any progression of the intracranial stenosis or silent infarcts without clinical sequela, but will defer to the Stroke Neurology service unless they prefer we also follow th

## 2021-07-29 ENCOUNTER — OFFICE VISIT (OUTPATIENT)
Dept: NEUROLOGY | Facility: CLINIC | Age: 61
End: 2021-07-29
Payer: COMMERCIAL

## 2021-07-29 ENCOUNTER — TELEPHONE (OUTPATIENT)
Dept: NEUROLOGY | Facility: CLINIC | Age: 61
End: 2021-07-29

## 2021-07-29 VITALS
DIASTOLIC BLOOD PRESSURE: 82 MMHG | HEIGHT: 73 IN | HEART RATE: 70 BPM | SYSTOLIC BLOOD PRESSURE: 118 MMHG | WEIGHT: 283 LBS | BODY MASS INDEX: 37.51 KG/M2

## 2021-07-29 DIAGNOSIS — I65.1 BASILAR ARTERY STENOSIS: Primary | ICD-10-CM

## 2021-07-29 PROCEDURE — 3074F SYST BP LT 130 MM HG: CPT | Performed by: OTHER

## 2021-07-29 PROCEDURE — 3079F DIAST BP 80-89 MM HG: CPT | Performed by: OTHER

## 2021-07-29 PROCEDURE — 3008F BODY MASS INDEX DOCD: CPT | Performed by: OTHER

## 2021-07-29 PROCEDURE — 99214 OFFICE O/P EST MOD 30 MIN: CPT | Performed by: OTHER

## 2021-07-29 NOTE — TELEPHONE ENCOUNTER
Pt spouse brought in Forms to be filled out [employer] form). Form filled out & placed on Dr Maribeth Espinosa desk for review & signature. Please call pt spouse or patient when ready to .

## 2021-07-29 NOTE — PROGRESS NOTES
[facility] 37  [address]  [phone]        Neurology Clinic Follow Up Note    Chief Complaint:  Stroke (Pt presents today with spouse for hosptal follow-up following CVA, Cardiac cath with stent infarct likely in his archana responsible for his recurrent episodes of diaphoresis, vertigo ,and reported facial droop. He was briefly placed on Midrin.   Mr. Minna Valdes remained asymptomatic for the following 2 days however his blood pressure did not improve sig Carotids:   - Pulmonary: Normal excursion of the chest.  No signs of respiratory distress. Neurologic Exam  - Mental Status: Alert and attentive. Oriented x 4. Speech is spontaneous, fluent, and prosodic. Comprehension and repetition intact.  Phrase emmett 10. Dysarthria: 0   11. Extinction and Inattention: 0    Total:   0       Modified [place] scale score: 0    Most recent lab results:   Reviewed and assessed  No results found for this or any previous visit (from the past 36 hour(s)).     Diagnostic studies: and increased intake of potassium, increase consumption of fruits, vegetables, and low-fat dairy products and  decreased consumption of saturated fat.    •            The patient's plan is  changed on 07/29/21    Education Provided  Education/Instructions g

## 2021-07-29 NOTE — TELEPHONE ENCOUNTER
FAX received for STD request for additional documentation. Additional records faxed to [employer] on 7/29/21 @ 4:30pm after pt seen in office. JEANMARIE on file.  Fax confirmation received

## 2021-07-29 NOTE — PATIENT INSTRUCTIONS
ESTEBAN CORONEL    B: Balance-- sudden loss  of balance, staggering gait, severe vertigo        E: Eyes-- sudden loss of vision in one or both eyes, onset of double vision        F: Face-- uneven or drooping face, drooling, ask the patient to smile        A:

## 2021-08-19 ENCOUNTER — TELEPHONE (OUTPATIENT)
Dept: NEUROLOGY | Facility: CLINIC | Age: 61
End: 2021-08-19

## 2021-08-19 NOTE — TELEPHONE ENCOUNTER
Spoke to patient's wife. She states on Tuesday morning patient went for a walk and when he got back he had dizziness and blurred vision that lasted a few minutes and went away after he rested.   He did not have any episodes yesterday or today that she know

## 2021-08-19 NOTE — TELEPHONE ENCOUNTER
Patients wife called because on this past Tuesday he was having blurriness and dizziness. Dr. Jean Miranda had told her to call if he had any episodes like this. He does have an appt. For 8/27.

## 2021-08-27 ENCOUNTER — TELEPHONE (OUTPATIENT)
Dept: NEUROLOGY | Facility: CLINIC | Age: 61
End: 2021-08-27

## 2021-08-27 ENCOUNTER — OFFICE VISIT (OUTPATIENT)
Dept: NEUROLOGY | Facility: CLINIC | Age: 61
End: 2021-08-27
Payer: COMMERCIAL

## 2021-08-27 VITALS
HEART RATE: 68 BPM | SYSTOLIC BLOOD PRESSURE: 118 MMHG | WEIGHT: 283 LBS | HEIGHT: 73 IN | BODY MASS INDEX: 37.51 KG/M2 | DIASTOLIC BLOOD PRESSURE: 70 MMHG

## 2021-08-27 DIAGNOSIS — R55 POSTURAL DIZZINESS WITH PRESYNCOPE: ICD-10-CM

## 2021-08-27 DIAGNOSIS — I65.1 BASILAR ARTERY STENOSIS: Primary | ICD-10-CM

## 2021-08-27 DIAGNOSIS — H53.2 DIPLOPIA: ICD-10-CM

## 2021-08-27 DIAGNOSIS — R42 POSTURAL DIZZINESS WITH PRESYNCOPE: ICD-10-CM

## 2021-08-27 PROCEDURE — 3008F BODY MASS INDEX DOCD: CPT | Performed by: OTHER

## 2021-08-27 PROCEDURE — 99214 OFFICE O/P EST MOD 30 MIN: CPT | Performed by: OTHER

## 2021-08-27 PROCEDURE — 3074F SYST BP LT 130 MM HG: CPT | Performed by: OTHER

## 2021-08-27 PROCEDURE — 3078F DIAST BP <80 MM HG: CPT | Performed by: OTHER

## 2021-08-27 NOTE — TELEPHONE ENCOUNTER
Contacted [employer] online to initiate authorization for Brain MRI CPT 69420 to be done at [address] [place] [address].   Coverage is active    Status: Approved  With order #[id number] valid 8/27/21-9/25/21    LM informing patient of the above and provided # to [address] [place] [address] scheduling

## 2021-08-27 NOTE — PROGRESS NOTES
[facility]  [address]  [phone]        Neurology Clinic Follow Up Note    Chief Complaint:  Neurologic Problem (LOV 7/29/21. Pt presents today with spouse.  Pt out walking a 2 weeks ago & got Catie. Mr. Supa Garcia remained asymptomatic for the following 2 days however his blood pressure did not improve significantly. 08/27/21 Interval History/Subjective:  Was walking and had 5 minutes of diplopia.    It was hot outside he felt lightheaded/like h 08/27/21  1148   BP: 118/70   Pulse: 68       Exam:  - General: appears stated age and no distress  - CV:    Carotids:   - Pulmonary: Normal excursion of the chest.  No signs of respiratory distress. Neurologic Exam  - Mental Status: Alert and attentive. left arm: 0   5b. Motor right arm: 0   6a. motor left le   6b  Motor right le   7. Limb Ataxia: 0   8. Sensory: 0   9. Best Language:  0   10. Dysarthria: 0   11.  Extinction and Inattention: 0    Total:   0       Modified [place] scale score: 0 possible ZACHARY  Recommend DASH diet.  A diet that is rich in fruits and vegetables and thereby high in potassium is beneficial. Recommend reduced intake of sodium and increased intake of potassium, increase consumption of fruits, vegetables, and low-fat dairy

## 2021-08-30 ENCOUNTER — HOSPITAL ENCOUNTER (OUTPATIENT)
Dept: MRI IMAGING | Facility: HOSPITAL | Age: 61
Discharge: HOME | End: 2021-08-30
Attending: Other
Payer: COMMERCIAL

## 2021-08-30 DIAGNOSIS — I65.1 BASILAR ARTERY STENOSIS: ICD-10-CM

## 2021-08-30 DIAGNOSIS — R55 POSTURAL DIZZINESS WITH PRESYNCOPE: ICD-10-CM

## 2021-08-30 DIAGNOSIS — R42 POSTURAL DIZZINESS WITH PRESYNCOPE: ICD-10-CM

## 2021-08-30 DIAGNOSIS — H53.2 DIPLOPIA: ICD-10-CM

## 2021-08-30 PROCEDURE — 70551 MRI BRAIN STEM W/O DYE: CPT | Performed by: OTHER

## 2021-09-13 ENCOUNTER — TELEPHONE (OUTPATIENT)
Dept: NEUROLOGY | Facility: CLINIC | Age: 61
End: 2021-09-13

## 2021-09-13 NOTE — TELEPHONE ENCOUNTER
----- Message from Elva Cruz DO sent at 9/13/2021  8:31 AM CDT -----  Please let him know his repeat scan did not demonstrate any new strokes.

## 2021-12-17 ENCOUNTER — OFFICE VISIT (OUTPATIENT)
Dept: INTERNAL MEDICINE CLINIC | Facility: CLINIC | Age: 61
End: 2021-12-17
Payer: COMMERCIAL

## 2021-12-17 VITALS
RESPIRATION RATE: 17 BRPM | OXYGEN SATURATION: 98 % | HEART RATE: 70 BPM | DIASTOLIC BLOOD PRESSURE: 66 MMHG | WEIGHT: 295 LBS | TEMPERATURE: 98 F | SYSTOLIC BLOOD PRESSURE: 122 MMHG | HEIGHT: 73 IN | BODY MASS INDEX: 39.1 KG/M2

## 2021-12-17 DIAGNOSIS — N52.9 ERECTILE DYSFUNCTION, UNSPECIFIED ERECTILE DYSFUNCTION TYPE: ICD-10-CM

## 2021-12-17 DIAGNOSIS — R53.83 OTHER FATIGUE: ICD-10-CM

## 2021-12-17 DIAGNOSIS — I25.118 CORONARY ARTERY DISEASE OF NATIVE ARTERY OF NATIVE HEART WITH STABLE ANGINA PECTORIS (HCC): Primary | ICD-10-CM

## 2021-12-17 DIAGNOSIS — R21 RASH: ICD-10-CM

## 2021-12-17 DIAGNOSIS — I65.1 BASILAR ARTERY STENOSIS: ICD-10-CM

## 2021-12-17 DIAGNOSIS — I63.9 CEREBROVASCULAR ACCIDENT (CVA), UNSPECIFIED MECHANISM (HCC): ICD-10-CM

## 2021-12-17 PROCEDURE — 3074F SYST BP LT 130 MM HG: CPT | Performed by: INTERNAL MEDICINE

## 2021-12-17 PROCEDURE — 99214 OFFICE O/P EST MOD 30 MIN: CPT | Performed by: INTERNAL MEDICINE

## 2021-12-17 PROCEDURE — 3008F BODY MASS INDEX DOCD: CPT | Performed by: INTERNAL MEDICINE

## 2021-12-17 PROCEDURE — 36415 COLL VENOUS BLD VENIPUNCTURE: CPT | Performed by: INTERNAL MEDICINE

## 2021-12-17 PROCEDURE — 3078F DIAST BP <80 MM HG: CPT | Performed by: INTERNAL MEDICINE

## 2021-12-20 DIAGNOSIS — E55.9 VITAMIN D DEFICIENCY: Primary | ICD-10-CM

## 2021-12-20 NOTE — PROGRESS NOTES
Subjective:     Patient ID: India Hill is a 59year old male. HPI  Patient comes in today for follow-up overall doing okay.   Seeing a neurosurgeon no need for any further treatment at this time follow-up in 1 year with MRI that was to note from ne Objective:   Physical Exam  Vitals and nursing note reviewed. Constitutional:       Appearance: He is well-developed. HENT:      Head: Normocephalic and atraumatic.       Right Ear: External ear normal.      Left Ear: External ear normal.      Nose: T4      Vitamin D, 25-Hydroxy      T4, FREE (S)      Free T3 (Triiodothryronine)      Meds This Visit:  Requested Prescriptions     Signed Prescriptions Disp Refills   • Clobetasol Propionate 0.025 % External Cream 100 g 1     Sig: Apply 1 Application topi

## 2021-12-22 ENCOUNTER — TELEPHONE (OUTPATIENT)
Dept: INTERNAL MEDICINE CLINIC | Facility: CLINIC | Age: 61
End: 2021-12-22

## 2021-12-22 DIAGNOSIS — R79.89 ABNORMAL TSH: Primary | ICD-10-CM

## 2021-12-22 NOTE — TELEPHONE ENCOUNTER
Patient's spouse is requesting a referral for a the patient to see an Endocrinologist, Dr. Duc Russell, per Dr request and states the appointment is on 2/1/2022.

## 2021-12-23 NOTE — TELEPHONE ENCOUNTER
Good Morning Dr Que Shah and staff,      Please see message below and generate a referral if you agree with plan of care.     Thank you,    JERI CHEEMA

## 2022-01-28 ENCOUNTER — TELEPHONE (OUTPATIENT)
Dept: CASE MANAGEMENT | Age: 62
End: 2022-01-28

## 2022-01-28 NOTE — TELEPHONE ENCOUNTER
Dr. Donnie Gonzalez,    [facility] is requesting a referral for the patient for follow up visits for hypertension. Patient has appointment today without referral.     Pended referral please review diagnosis and sign off if you agree. Please forward the information below to your staff to update the patient's insurance. The patient no longer has PPO and now has IHP HMO. Patient has new insurance  [facility]  Group [id number]  Group [id number]  ID [id number]    Thank you.   Jose Edwards

## 2022-01-31 ENCOUNTER — MED REC SCAN ONLY (OUTPATIENT)
Dept: INTERNAL MEDICINE CLINIC | Facility: CLINIC | Age: 62
End: 2022-01-31

## 2022-04-28 ENCOUNTER — OFFICE VISIT (OUTPATIENT)
Dept: INTERNAL MEDICINE CLINIC | Facility: CLINIC | Age: 62
End: 2022-04-28
Payer: COMMERCIAL

## 2022-04-28 VITALS
DIASTOLIC BLOOD PRESSURE: 76 MMHG | SYSTOLIC BLOOD PRESSURE: 128 MMHG | BODY MASS INDEX: 39.89 KG/M2 | HEIGHT: 73 IN | RESPIRATION RATE: 18 BRPM | TEMPERATURE: 98 F | WEIGHT: 301 LBS | HEART RATE: 67 BPM | OXYGEN SATURATION: 99 %

## 2022-04-28 DIAGNOSIS — I25.118 CORONARY ARTERY DISEASE OF NATIVE ARTERY OF NATIVE HEART WITH STABLE ANGINA PECTORIS (HCC): ICD-10-CM

## 2022-04-28 DIAGNOSIS — R79.89 ABNORMAL TSH: Primary | ICD-10-CM

## 2022-04-28 DIAGNOSIS — R73.09 ELEVATED GLUCOSE: ICD-10-CM

## 2022-04-28 DIAGNOSIS — E55.9 VITAMIN D DEFICIENCY: ICD-10-CM

## 2022-04-28 LAB
EST. AVERAGE GLUCOSE BLD GHB EST-MCNC: 137 MG/DL (ref 68–126)
HBA1C MFR BLD: 6.4 % (ref ?–5.7)
TSI SER-ACNC: 0.42 MIU/ML (ref 0.36–3.74)

## 2022-04-28 PROCEDURE — 99214 OFFICE O/P EST MOD 30 MIN: CPT | Performed by: INTERNAL MEDICINE

## 2022-04-28 PROCEDURE — 3074F SYST BP LT 130 MM HG: CPT | Performed by: INTERNAL MEDICINE

## 2022-04-28 PROCEDURE — 3008F BODY MASS INDEX DOCD: CPT | Performed by: INTERNAL MEDICINE

## 2022-04-28 PROCEDURE — 3078F DIAST BP <80 MM HG: CPT | Performed by: INTERNAL MEDICINE

## 2022-04-28 PROCEDURE — 36415 COLL VENOUS BLD VENIPUNCTURE: CPT | Performed by: INTERNAL MEDICINE

## 2022-04-28 RX ORDER — ERGOCALCIFEROL 1.25 MG/1
CAPSULE ORAL
Qty: 12 CAPSULE | Refills: 0 | OUTPATIENT
Start: 2022-04-28

## 2022-04-28 RX ORDER — ERGOCALCIFEROL 1.25 MG/1
CAPSULE ORAL
Qty: 12 CAPSULE | Refills: 0 | Status: SHIPPED | OUTPATIENT
Start: 2022-04-28

## 2022-04-28 RX ORDER — ERGOCALCIFEROL 1.25 MG/1
50000 CAPSULE ORAL WEEKLY
Qty: 8 CAPSULE | Refills: 0 | Status: SHIPPED | OUTPATIENT
Start: 2022-04-28 | End: 2022-04-28

## 2022-04-28 RX ORDER — ERGOCALCIFEROL 1.25 MG/1
50000 CAPSULE ORAL WEEKLY
Qty: 8 CAPSULE | Refills: 0 | Status: SHIPPED | OUTPATIENT
Start: 2022-04-28 | End: 2022-05-28

## 2022-04-29 ENCOUNTER — TELEPHONE (OUTPATIENT)
Dept: NEUROLOGY | Facility: CLINIC | Age: 62
End: 2022-04-29

## 2022-04-29 NOTE — TELEPHONE ENCOUNTER
Spoke to patient's wife who states that patient's work is requesting note for days he has to take off. He has a follow-up with Dr. Janelle Baker on 5/13 at 12 pm.  Letter drafted and ready for  at . She was very thankful.

## 2022-05-02 ENCOUNTER — MED REC SCAN ONLY (OUTPATIENT)
Dept: INTERNAL MEDICINE CLINIC | Facility: CLINIC | Age: 62
End: 2022-05-02

## 2022-05-04 ENCOUNTER — TELEPHONE (OUTPATIENT)
Dept: INTERNAL MEDICINE CLINIC | Facility: CLINIC | Age: 62
End: 2022-05-04

## 2022-05-04 ENCOUNTER — TELEPHONE (OUTPATIENT)
Dept: NEUROLOGY | Facility: CLINIC | Age: 62
End: 2022-05-04

## 2022-05-04 DIAGNOSIS — Z86.73 HISTORY OF CVA (CEREBROVASCULAR ACCIDENT): Primary | ICD-10-CM

## 2022-05-04 NOTE — TELEPHONE ENCOUNTER
Pt spouse calling to confirm if a catscan will be performed on 5/13/22 so they can be prepare and obtain referral  [phone]

## 2022-05-04 NOTE — TELEPHONE ENCOUNTER
Good Afternoon Dr Lauryn Sánchez and staff,    Please see message below and generate referral for Neuro if you agree with plan of care.     Thank you    [facility]

## 2022-05-04 NOTE — TELEPHONE ENCOUNTER
Spoke to patient's wife and notified her that CT scan will not be at visit. She was understanding. She wanted Dr. Jim Plata to know that patient has been having some trouble with memory and wanted Dr. Jim Plata to know this before his appointment on 5/13.

## 2022-05-04 NOTE — TELEPHONE ENCOUNTER
Patient is requesting referral.     Name of specialist and specialty department : Dr. Chapincito Fagan, Neurologist  Reason for visit with the specialist: Examine brain. Address of the specialist office: [facility]   Appointment date: 5/13/2022         CSS informed patient the turnaround time for referral is 5-7 business days. Patient was informed to check their [employer] account for referral status.

## 2022-05-13 ENCOUNTER — LAB ENCOUNTER (OUTPATIENT)
Dept: LAB | Facility: HOSPITAL | Age: 62
End: 2022-05-13
Attending: Other
Payer: COMMERCIAL

## 2022-05-13 ENCOUNTER — OFFICE VISIT (OUTPATIENT)
Dept: NEUROLOGY | Facility: CLINIC | Age: 62
End: 2022-05-13
Payer: COMMERCIAL

## 2022-05-13 VITALS
HEIGHT: 73 IN | BODY MASS INDEX: 38.43 KG/M2 | DIASTOLIC BLOOD PRESSURE: 76 MMHG | SYSTOLIC BLOOD PRESSURE: 122 MMHG | HEART RATE: 62 BPM | WEIGHT: 290 LBS

## 2022-05-13 DIAGNOSIS — R41.3 SHORT-TERM MEMORY LOSS: Primary | ICD-10-CM

## 2022-05-13 DIAGNOSIS — R41.3 SHORT-TERM MEMORY LOSS: ICD-10-CM

## 2022-05-13 LAB
FOLATE SERPL-MCNC: 14.7 NG/ML (ref 8.7–?)
TSI SER-ACNC: 0.46 MIU/ML (ref 0.36–3.74)
VIT B12 SERPL-MCNC: 371 PG/ML (ref 193–986)

## 2022-05-13 PROCEDURE — 86780 TREPONEMA PALLIDUM: CPT

## 2022-05-13 PROCEDURE — 3008F BODY MASS INDEX DOCD: CPT | Performed by: OTHER

## 2022-05-13 PROCEDURE — 3074F SYST BP LT 130 MM HG: CPT | Performed by: OTHER

## 2022-05-13 PROCEDURE — 82607 VITAMIN B-12: CPT

## 2022-05-13 PROCEDURE — 82746 ASSAY OF FOLIC ACID SERUM: CPT

## 2022-05-13 PROCEDURE — 99214 OFFICE O/P EST MOD 30 MIN: CPT | Performed by: OTHER

## 2022-05-13 PROCEDURE — 84443 ASSAY THYROID STIM HORMONE: CPT

## 2022-05-13 PROCEDURE — 36415 COLL VENOUS BLD VENIPUNCTURE: CPT

## 2022-05-13 PROCEDURE — 83921 ORGANIC ACID SINGLE QUANT: CPT

## 2022-05-13 PROCEDURE — 3078F DIAST BP <80 MM HG: CPT | Performed by: OTHER

## 2022-05-13 NOTE — PATIENT INSTRUCTIONS
Get the neuropsychological testing. Get the blood work     Go back to checking your blood pressure twice a day. Once in the morning and once in the evening. In the morning take it before you take your medications.

## 2022-05-16 LAB — T PALLIDUM AB SER QL: NEGATIVE

## 2022-05-18 LAB — MMA: 0.17 UMOL/L

## 2022-05-25 ENCOUNTER — TELEPHONE (OUTPATIENT)
Dept: SURGERY | Facility: CLINIC | Age: 62
End: 2022-05-25

## 2022-05-25 NOTE — TELEPHONE ENCOUNTER
\"Follow-up MRI/MRA study in 1 year may be helpful to assess any progression of the intracranial stenosis or silent infarcts without clinical sequela, but will defer to the Stroke Neurology service. \" - per Dr. Kris Bray note. Patient just recently saw Dr. Gina Hook (Neurology) on 5/13/2022. Will defer further follow up imaging/orders to him. His note states repeat MRI.

## 2022-05-25 NOTE — TELEPHONE ENCOUNTER
Per patient reminder:  \"MRA of the head completed on 6/22/21. Per Dr. Mert Shah, patient to have MRI/MRA in one year and follow up with Neurology. Thank you! \"    MRA/MRI order needed.

## 2022-06-13 ENCOUNTER — TELEPHONE (OUTPATIENT)
Dept: SURGERY | Facility: CLINIC | Age: 62
End: 2022-06-13

## 2022-06-21 RX ORDER — ASPIRIN 81 MG/1
TABLET, COATED ORAL
Qty: 90 TABLET | Refills: 3 | Status: SHIPPED | OUTPATIENT
Start: 2022-06-21

## 2022-06-21 NOTE — TELEPHONE ENCOUNTER
Refill request for aspirin 81 mg, take 1 tab daily, #90, 3 refills    LOV: 5/13/22  NOV: None  Last refilled on 6/25/21 for one year supply

## 2022-07-18 RX ORDER — ERGOCALCIFEROL 1.25 MG/1
50000 CAPSULE ORAL WEEKLY
Qty: 12 CAPSULE | Refills: 0 | Status: SHIPPED | OUTPATIENT
Start: 2022-07-18

## 2022-07-18 NOTE — TELEPHONE ENCOUNTER
Pt on the phone stating [address] informed pt that they are still waiting for Dr. Huy Vera to approve the following medication for a refill. Pt is stating he is currently out of medication.      ERGOCALCIFEROL 1.25 MG (06103 UT) Oral Cap

## 2022-07-28 ENCOUNTER — OFFICE VISIT (OUTPATIENT)
Dept: INTERNAL MEDICINE CLINIC | Facility: CLINIC | Age: 62
End: 2022-07-28
Payer: COMMERCIAL

## 2022-07-28 VITALS
HEIGHT: 73 IN | DIASTOLIC BLOOD PRESSURE: 81 MMHG | OXYGEN SATURATION: 96 % | BODY MASS INDEX: 38.92 KG/M2 | RESPIRATION RATE: 17 BRPM | SYSTOLIC BLOOD PRESSURE: 126 MMHG | WEIGHT: 293.63 LBS | TEMPERATURE: 98 F | HEART RATE: 75 BPM

## 2022-07-28 DIAGNOSIS — N52.9 ERECTILE DYSFUNCTION, UNSPECIFIED ERECTILE DYSFUNCTION TYPE: ICD-10-CM

## 2022-07-28 DIAGNOSIS — R73.03 PRE-DIABETES: Primary | ICD-10-CM

## 2022-07-28 DIAGNOSIS — I10 PRIMARY HYPERTENSION: ICD-10-CM

## 2022-07-28 DIAGNOSIS — M79.675 CHRONIC PAIN OF TOE OF LEFT FOOT: ICD-10-CM

## 2022-07-28 DIAGNOSIS — G89.29 CHRONIC PAIN OF TOE OF LEFT FOOT: ICD-10-CM

## 2022-07-28 PROCEDURE — 99214 OFFICE O/P EST MOD 30 MIN: CPT | Performed by: INTERNAL MEDICINE

## 2022-07-28 PROCEDURE — 3079F DIAST BP 80-89 MM HG: CPT | Performed by: INTERNAL MEDICINE

## 2022-07-28 PROCEDURE — 3074F SYST BP LT 130 MM HG: CPT | Performed by: INTERNAL MEDICINE

## 2022-07-28 PROCEDURE — 3008F BODY MASS INDEX DOCD: CPT | Performed by: INTERNAL MEDICINE

## 2022-08-24 ENCOUNTER — TELEPHONE (OUTPATIENT)
Dept: NEUROLOGY | Facility: CLINIC | Age: 62
End: 2022-08-24

## 2022-08-24 NOTE — TELEPHONE ENCOUNTER
Spoke to patient spouse, Saurabh Babb (HIPPA Verified). She states at last appointment with Dr Cristina Burris, specialist was ordered & they were supposed to reach out to them to schedule. Patient has yet to hear from them. Per Epic review, [address] ordered at [address] [place] [address] on 5/13/22. Provided spouse with phone number to [facility] [phone].     Advised to office if any problems with scheduling

## 2022-08-25 ENCOUNTER — OFFICE VISIT (OUTPATIENT)
Dept: INTERNAL MEDICINE CLINIC | Facility: CLINIC | Age: 62
End: 2022-08-25
Payer: COMMERCIAL

## 2022-08-25 VITALS
TEMPERATURE: 98 F | SYSTOLIC BLOOD PRESSURE: 130 MMHG | DIASTOLIC BLOOD PRESSURE: 86 MMHG | HEIGHT: 73 IN | OXYGEN SATURATION: 98 % | HEART RATE: 78 BPM | WEIGHT: 299 LBS | RESPIRATION RATE: 18 BRPM | BODY MASS INDEX: 39.63 KG/M2

## 2022-08-25 DIAGNOSIS — N52.9 ERECTILE DYSFUNCTION, UNSPECIFIED ERECTILE DYSFUNCTION TYPE: ICD-10-CM

## 2022-08-25 DIAGNOSIS — R39.198 DIFFICULTY URINATING: ICD-10-CM

## 2022-08-25 DIAGNOSIS — G89.29 CHRONIC PAIN OF TOE OF LEFT FOOT: ICD-10-CM

## 2022-08-25 DIAGNOSIS — R35.1 NOCTURIA: ICD-10-CM

## 2022-08-25 DIAGNOSIS — R73.03 PRE-DIABETES: ICD-10-CM

## 2022-08-25 DIAGNOSIS — Z86.73 HISTORY OF CVA (CEREBROVASCULAR ACCIDENT): ICD-10-CM

## 2022-08-25 DIAGNOSIS — I10 PRIMARY HYPERTENSION: ICD-10-CM

## 2022-08-25 DIAGNOSIS — I25.118 CORONARY ARTERY DISEASE OF NATIVE ARTERY OF NATIVE HEART WITH STABLE ANGINA PECTORIS (HCC): ICD-10-CM

## 2022-08-25 DIAGNOSIS — Z00.00 ANNUAL PHYSICAL EXAM: Primary | ICD-10-CM

## 2022-08-25 DIAGNOSIS — I65.1 BASILAR ARTERY STENOSIS: ICD-10-CM

## 2022-08-25 DIAGNOSIS — M79.675 CHRONIC PAIN OF TOE OF LEFT FOOT: ICD-10-CM

## 2022-08-25 DIAGNOSIS — M25.561 ACUTE PAIN OF RIGHT KNEE: ICD-10-CM

## 2022-08-25 PROCEDURE — 3008F BODY MASS INDEX DOCD: CPT | Performed by: INTERNAL MEDICINE

## 2022-08-25 PROCEDURE — 99396 PREV VISIT EST AGE 40-64: CPT | Performed by: INTERNAL MEDICINE

## 2022-08-25 PROCEDURE — 3079F DIAST BP 80-89 MM HG: CPT | Performed by: INTERNAL MEDICINE

## 2022-08-25 PROCEDURE — 3075F SYST BP GE 130 - 139MM HG: CPT | Performed by: INTERNAL MEDICINE

## 2022-08-25 RX ORDER — AMLODIPINE BESYLATE AND BENAZEPRIL HYDROCHLORIDE 5; 20 MG/1; MG/1
CAPSULE ORAL
COMMUNITY
Start: 2022-07-28

## 2022-08-25 RX ORDER — ATORVASTATIN CALCIUM 80 MG/1
80 TABLET, FILM COATED ORAL NIGHTLY
COMMUNITY

## 2022-08-26 ENCOUNTER — TELEPHONE (OUTPATIENT)
Dept: INTERNAL MEDICINE CLINIC | Facility: CLINIC | Age: 62
End: 2022-08-26

## 2022-08-26 NOTE — TELEPHONE ENCOUNTER
Dawna from [employer] is calling to advise PCP the patient is trying to schedule xray appointment and does not see the orders. Patient notes there should be an xray of the toe as well besides the knee    Please advise.     Requesting order be faxed to the following:    FAX #[phone]    Any questions call back #[phone]

## 2022-09-01 ENCOUNTER — HOSPITAL ENCOUNTER (OUTPATIENT)
Dept: GENERAL RADIOLOGY | Facility: HOSPITAL | Age: 62
Discharge: HOME | End: 2022-09-01
Attending: INTERNAL MEDICINE
Payer: COMMERCIAL

## 2022-09-01 ENCOUNTER — LAB ENCOUNTER (OUTPATIENT)
Dept: LAB | Facility: REFERENCE LAB | Age: 62
End: 2022-09-01
Attending: INTERNAL MEDICINE
Payer: COMMERCIAL

## 2022-09-01 DIAGNOSIS — Z00.00 ANNUAL PHYSICAL EXAM: ICD-10-CM

## 2022-09-01 DIAGNOSIS — I25.118 CORONARY ARTERY DISEASE OF NATIVE ARTERY OF NATIVE HEART WITH STABLE ANGINA PECTORIS (HCC): ICD-10-CM

## 2022-09-01 DIAGNOSIS — I10 PRIMARY HYPERTENSION: ICD-10-CM

## 2022-09-01 DIAGNOSIS — M79.675 CHRONIC PAIN OF TOE OF LEFT FOOT: ICD-10-CM

## 2022-09-01 DIAGNOSIS — R35.1 NOCTURIA: ICD-10-CM

## 2022-09-01 DIAGNOSIS — M25.561 ACUTE PAIN OF RIGHT KNEE: ICD-10-CM

## 2022-09-01 DIAGNOSIS — Z86.73 HISTORY OF CVA (CEREBROVASCULAR ACCIDENT): ICD-10-CM

## 2022-09-01 DIAGNOSIS — G89.29 CHRONIC PAIN OF TOE OF LEFT FOOT: ICD-10-CM

## 2022-09-01 DIAGNOSIS — R73.09 ELEVATED GLUCOSE: ICD-10-CM

## 2022-09-01 DIAGNOSIS — N52.9 ERECTILE DYSFUNCTION, UNSPECIFIED ERECTILE DYSFUNCTION TYPE: ICD-10-CM

## 2022-09-01 DIAGNOSIS — E55.9 VITAMIN D DEFICIENCY: ICD-10-CM

## 2022-09-01 DIAGNOSIS — R39.198 DIFFICULTY URINATING: ICD-10-CM

## 2022-09-01 DIAGNOSIS — R73.03 PRE-DIABETES: ICD-10-CM

## 2022-09-01 LAB
ALBUMIN SERPL-MCNC: 3.2 G/DL (ref 3.4–5)
ALBUMIN/GLOB SERPL: 0.8 {RATIO} (ref 1–2)
ALP LIVER SERPL-CCNC: 91 U/L
ALT SERPL-CCNC: 28 U/L
ANION GAP SERPL CALC-SCNC: 8 MMOL/L (ref 0–18)
AST SERPL-CCNC: 19 U/L (ref 15–37)
BILIRUB SERPL-MCNC: 0.4 MG/DL (ref 0.1–2)
BUN BLD-MCNC: 12 MG/DL (ref 7–18)
BUN/CREAT SERPL: 11.2 (ref 10–20)
CALCIUM BLD-MCNC: 9.5 MG/DL (ref 8.5–10.1)
CHLORIDE SERPL-SCNC: 108 MMOL/L (ref 98–112)
CHOLEST SERPL-MCNC: 125 MG/DL (ref ?–200)
CO2 SERPL-SCNC: 26 MMOL/L (ref 21–32)
COMPLEXED PSA SERPL-MCNC: 1.29 NG/ML (ref ?–4)
CREAT BLD-MCNC: 1.07 MG/DL
EST. AVERAGE GLUCOSE BLD GHB EST-MCNC: 140 MG/DL (ref 68–126)
FASTING PATIENT LIPID ANSWER: YES
GFR SERPLBLD BASED ON 1.73 SQ M-ARVRAT: 77 ML/MIN/1.73M2 (ref 60–?)
GLOBULIN PLAS-MCNC: 4.1 G/DL (ref 2.8–4.4)
GLUCOSE BLD-MCNC: 104 MG/DL (ref 70–99)
HBA1C MFR BLD: 6.5 % (ref ?–5.7)
HDLC SERPL-MCNC: 40 MG/DL (ref 40–59)
LDLC SERPL CALC-MCNC: 65 MG/DL (ref ?–100)
NONHDLC SERPL-MCNC: 85 MG/DL (ref ?–130)
OSMOLALITY SERPL CALC.SUM OF ELEC: 294 MOSM/KG (ref 275–295)
POTASSIUM SERPL-SCNC: 4.1 MMOL/L (ref 3.5–5.1)
PROT SERPL-MCNC: 7.3 G/DL (ref 6.4–8.2)
SODIUM SERPL-SCNC: 142 MMOL/L (ref 136–145)
TRIGL SERPL-MCNC: 105 MG/DL (ref 30–149)
URATE SERPL-MCNC: 6.5 MG/DL
VIT D+METAB SERPL-MCNC: 39.4 NG/ML (ref 30–100)
VLDLC SERPL CALC-MCNC: 16 MG/DL (ref 0–30)

## 2022-09-01 PROCEDURE — 73660 X-RAY EXAM OF TOE(S): CPT | Performed by: INTERNAL MEDICINE

## 2022-09-01 PROCEDURE — 36415 COLL VENOUS BLD VENIPUNCTURE: CPT

## 2022-09-01 PROCEDURE — 82306 VITAMIN D 25 HYDROXY: CPT

## 2022-09-01 PROCEDURE — 73562 X-RAY EXAM OF KNEE 3: CPT | Performed by: INTERNAL MEDICINE

## 2022-09-01 PROCEDURE — 80053 COMPREHEN METABOLIC PANEL: CPT

## 2022-09-01 PROCEDURE — 83036 HEMOGLOBIN GLYCOSYLATED A1C: CPT

## 2022-09-01 PROCEDURE — 84402 ASSAY OF FREE TESTOSTERONE: CPT

## 2022-09-01 PROCEDURE — 80061 LIPID PANEL: CPT

## 2022-09-01 PROCEDURE — 84550 ASSAY OF BLOOD/URIC ACID: CPT

## 2022-09-01 PROCEDURE — 84403 ASSAY OF TOTAL TESTOSTERONE: CPT

## 2022-09-02 ENCOUNTER — MED REC SCAN ONLY (OUTPATIENT)
Dept: INTERNAL MEDICINE CLINIC | Facility: CLINIC | Age: 62
End: 2022-09-02

## 2022-09-02 NOTE — PROGRESS NOTES
Pt informed that Labs are all normal overall but A1c has gone up to 6.5 this makes him diabetic  I would suggest to start low-dose metformin 500 twice daily also need to watch his carbs we will discuss further when he comes in for 3-month follow-up please schedule III month follow-up with me. Pt informed rx has been sent to his pharmacy.

## 2022-09-12 LAB
TESTOSTERONE, FREE, S: 7.93 NG/DL
TESTOSTERONE, TOTAL, S: 310 NG/DL

## 2022-10-17 RX ORDER — METOPROLOL SUCCINATE 100 MG/1
100 TABLET, EXTENDED RELEASE ORAL
Qty: 90 TABLET | Refills: 1 | Status: SHIPPED | OUTPATIENT
Start: 2022-10-17

## 2022-10-17 RX ORDER — ATORVASTATIN CALCIUM 80 MG/1
80 TABLET, FILM COATED ORAL NIGHTLY
Qty: 90 TABLET | Refills: 1 | Status: SHIPPED | OUTPATIENT
Start: 2022-10-17

## 2022-10-17 NOTE — TELEPHONE ENCOUNTER
rx's never ordered by PCP  Refill passed per [place] [employer] [facility] protocol. Requested Prescriptions   Pending Prescriptions Disp Refills    atorvastatin 80 MG Oral Tab 90 tablet 1     Sig: Take 1 tablet (80 mg total) by mouth nightly. Cholesterol Medication Protocol Passed - 10/17/2022 10:30 AM        Passed - ALT in past 12 months        Passed - LDL in past 12 months        Passed - Last ALT < 80       Lab Results   Component Value Date    ALT 28 09/01/2022             Passed - Last LDL < 130     Lab Results   Component Value Date    LDL 65 09/01/2022               Passed - In person appointment or virtual visit in the past 12 mos or appointment in next 3 mos       Recent Outpatient Visits              1 month ago Annual physical exam    [facility], [address], Jun Webb MD    Office Visit    2 months ago Pre-diabetes    [facility], [address], Jun Webb MD    Office Visit    5 months ago Short-term memory loss    [address]ili[address] Al[address] Tellez, DO Lane    Office Visit    5 months ago Abnormal TSH    [facility], [address], Christian Sullivan MD    Office Visit    10 months ago Coronary artery disease of native artery of native heart with stable angina pectoris [facility])    [facility], [address], Jun Webb MD    Office Visit     Future Appointments         Provider Department Appt Notes    In 2 weeks Rayshawn Rodríguez MD [facility], [address] 3 month    In 1 month Rayshawn Rodríguez MD [facility], [address] check up                   metoprolol succinate  MG Oral Tablet 24 Hr 90 tablet 1     Sig: Take 1 tablet (100 mg total) by mouth Daily Beta Blocker.         Hypertensive Medications Protocol Passed - 10/17/2022 10:30 AM        Passed - In person appointment in the past 12 or next 3 months       Recent Outpatient Visits              1 month ago Annual physical exam    [facility] Gutierrez MD    Office Visit    2 months ago Felecia Walls, [address], Veronica Sullivan MD    Office Visit    5 months ago Short-term memory loss    Han Tellez, DO [address]    Office Visit    5 months ago Abnormal TSH    [facility], [facility], [address], Veronica Sullivan MD    Office Visit    10 months ago Coronary artery disease of native artery of native heart with stable angina pectoris [facility])    [facility], [address], Makenzie Harp MD    Office Visit     Future Appointments         Provider Department Appt Notes    In 2 weeks Panfilo Wilkes MD [facility], [address], [place] 3 month    In 1 month Panfilo Wilkes MD [facility], [address], [place] check up                [employer] - Last BP reading less than 140/90     BP Readings from Last 1 Encounters:  08/25/22 : 130/86                Passed - CMP or BMP in past 6 months     Recent Results (from the past 4392 hour(s))   COMP METABOLIC PANEL (14)    Collection Time: 09/01/22  7:48 AM   Result Value Ref Range    Glucose 104 (H) 70 - 99 mg/dL    Sodium 142 136 - 145 mmol/L    Potassium 4.1 3.5 - 5.1 mmol/L    Chloride 108 98 - 112 mmol/L    CO2 26.0 21.0 - 32.0 mmol/L    Anion Gap 8 0 - 18 mmol/L    BUN 12 7 - 18 mg/dL    Creatinine 1.07 0.70 - 1.30 mg/dL    BUN/CREA Ratio 11.2 10.0 - 20.0    Calcium, Total 9.5 8.5 - 10.1 mg/dL    Calculated Osmolality 294 275 - 295 mOsm/kg    eGFR-Cr 77 >=60 mL/min/1.73m2    ALT 28 16 - 61 U/L    AST 19 15 - 37 U/L    Alkaline Phosphatase 91 45 - 117 U/L    Bilirubin, Total 0.4 0.1 - 2.0 mg/dL    Total Protein 7.3 6.4 - 8.2 g/dL    Albumin 3.2 (L) 3.4 - 5.0 g/dL    Globulin  4.1 2.8 - 4.4 g/dL    A/G Ratio 0.8 (L) 1.0 - 2.0     *Note: Due to a large number of results and/or encounters for the requested time period, some results have not been displayed. A complete set of results can be found in Results Review. Passed - In person appointment or virtual visit in the past 6 months       Recent Outpatient Visits              1 month ago Annual physical exam    [address], Katja Gallagher MD    Office Visit    2 months ago Pre-diabetes    [address], [address], Katja Gallagher MD    Office Visit    5 months ago Short-term memory loss    [address], DO    Office Visit    5 months ago Abnormal TSH    [address], [address], Christian Sullivan MD    Office Visit    10 months ago Coronary artery disease of native artery of native heart with stable angina pectoris [facility])    [address], [address], Katja Gallagher MD    Office Visit     Future Appointments         Provider Department Appt Notes    In 2 weeks Kate Box MD [address], [address] 3 month    In 1 month Kate Box MD [address], [address] check up                [address] or GFRAA > 50     GFR Evaluation  EGFRCR: 77 , resulted on 9/1/2022                   Future Appointments         Provider Department Appt Notes    In 2 weeks Kate Box MD [address], [address], [address] 3 month    In 1 month Kate Box MD [address], [address] check up             Recent Outpatient Visits              1 month ago Annual physical exam    [address], [address], Katja Gallagher MD    Office Visit    2 months ago Pre-diabetes    [address], [address], Katja Gallagher MD    Office Visit    5 months ago Short-term memory loss    [address], DO    Office Visit    5 months ago Abnormal TSH    [address], Christian Sullivan MD    Office Visit    10 months ago Coronary artery disease of native artery of native heart with stable angina pectoris [facility])    [address], [address], Katja Gallagher MD Office Visit

## 2022-10-17 NOTE — TELEPHONE ENCOUNTER
Patient called, verified Name and . He is requesting for refill of his medications. Unsure of the names of medication. Spoke to [address], verified patient's Name and . He states that patient needs refill on metoprolol and atorvastatin. Medication pended to run through protocol.

## 2022-10-25 NOTE — TELEPHONE ENCOUNTER
Refill passed per [facility] protocol.   Requested Prescriptions   Pending Prescriptions Disp Refills    METFORMIN 500 MG Oral Tab [Pharmacy Med Name: METFORMIN  MG TABLET] 60 tablet 2     Sig: TAKE 1 TABLET BY MOUTH TWICE A DAY WITH MEALS        Diabetes Medication Protocol Passed - 10/24/2022 12:32 PM        Passed - Last A1C < 7.5 and within past 6 months     Lab Results   Component Value Date    A1C 6.5 (H) 09/01/2022               Passed - In person appointment or virtual visit in the past 6 mos or appointment in next 3 mos       Recent Outpatient Visits              2 months ago Annual physical exam    [address] [facility], Shiloh Alvares MD    Office Visit    2 months ago Pre-diabetes    [place] [employer] [facility], [address], Shiloh Alvares MD    Office Visit    5 months ago Short-term memory loss    [address]     Office Visit    6 months ago Abnormal TSH    [place] [employer] [facility], [address], Christian Sullivan MD    Office Visit    10 months ago Coronary artery disease of native artery of native heart with stable angina pectoris [facility])    [place] [employer] [facility], [address], Shiloh Alvares MD    Office Visit     Future Appointments         Provider Department Appt Notes    In 1 week Daryl Irene MD [place] [facility] [facility], [address], [place] 3 month    In 1 month Daryl Irene MD [place] [employer] [facility], [address] check up                [address] or GFRAA > 50     GFR Evaluation  EGFRCR: 77 , resulted on 9/1/2022            Passed - GFR in the past 12 months            Recent Outpatient Visits              2 months ago Annual physical exam    [place] [facility], [address], Shiloh Alvares MD    Office Visit    2 months ago Pre-diabetes    [employer] [facility], [address], Shiloh Alvares MD    Office Visit    5 months ago Short-term memory loss    [address] [place]    Office Visit 6 months ago Abnormal TSH    Laurie Gordon Agron B, MD    Office Visit    10 months ago Coronary artery disease of native artery of native heart with stable angina pectoris [facility])    [place] [employer], [address], Michelle Mancia MD    Office Visit          Future Appointments         Provider Department Appt Notes    In 1 week Nancie Bundy MD [place] [employer] [facility], [address] 3 month    In 1 month Nancie Bundy MD [place] [employer] [facility], [address] check up

## 2022-11-01 ENCOUNTER — TELEPHONE (OUTPATIENT)
Dept: NEUROLOGY | Facility: CLINIC | Age: 62
End: 2022-11-01

## 2022-11-01 NOTE — TELEPHONE ENCOUNTER
Received neuro psych report from Dr. Crystal Boykin office. The patient has a pending appointment 2/16/23. Report has been placed in the nurse bin as well as sent for scanning. Reviewed and electronically signed by:  [address], [facility]

## 2022-12-08 ENCOUNTER — OFFICE VISIT (OUTPATIENT)
Dept: INTERNAL MEDICINE CLINIC | Facility: CLINIC | Age: 62
End: 2022-12-08
Payer: COMMERCIAL

## 2022-12-08 VITALS
HEART RATE: 62 BPM | HEIGHT: 73 IN | BODY MASS INDEX: 39.36 KG/M2 | OXYGEN SATURATION: 98 % | TEMPERATURE: 98 F | DIASTOLIC BLOOD PRESSURE: 86 MMHG | RESPIRATION RATE: 17 BRPM | SYSTOLIC BLOOD PRESSURE: 110 MMHG | WEIGHT: 297 LBS

## 2022-12-08 DIAGNOSIS — R51.9 NONINTRACTABLE EPISODIC HEADACHE, UNSPECIFIED HEADACHE TYPE: ICD-10-CM

## 2022-12-08 DIAGNOSIS — I10 PRIMARY HYPERTENSION: Primary | ICD-10-CM

## 2022-12-08 DIAGNOSIS — Z86.73 HISTORY OF CVA (CEREBROVASCULAR ACCIDENT): ICD-10-CM

## 2022-12-08 DIAGNOSIS — M25.551 HIP PAIN, ACUTE, RIGHT: ICD-10-CM

## 2022-12-08 DIAGNOSIS — I25.118 CORONARY ARTERY DISEASE OF NATIVE ARTERY OF NATIVE HEART WITH STABLE ANGINA PECTORIS (HCC): ICD-10-CM

## 2022-12-08 DIAGNOSIS — K59.00 CONSTIPATION, UNSPECIFIED CONSTIPATION TYPE: ICD-10-CM

## 2022-12-08 LAB
ANION GAP SERPL CALC-SCNC: 7 MMOL/L (ref 0–18)
BUN BLD-MCNC: 13 MG/DL (ref 7–18)
BUN/CREAT SERPL: 11.1 (ref 10–20)
CALCIUM BLD-MCNC: 9.7 MG/DL (ref 8.5–10.1)
CHLORIDE SERPL-SCNC: 106 MMOL/L (ref 98–112)
CO2 SERPL-SCNC: 27 MMOL/L (ref 21–32)
CREAT BLD-MCNC: 1.17 MG/DL
FASTING STATUS PATIENT QL REPORTED: NO
GFR SERPLBLD BASED ON 1.73 SQ M-ARVRAT: 70 ML/MIN/1.73M2 (ref 60–?)
GLUCOSE BLD-MCNC: 107 MG/DL (ref 70–99)
OSMOLALITY SERPL CALC.SUM OF ELEC: 291 MOSM/KG (ref 275–295)
POTASSIUM SERPL-SCNC: 3.9 MMOL/L (ref 3.5–5.1)
SODIUM SERPL-SCNC: 140 MMOL/L (ref 136–145)

## 2022-12-08 PROCEDURE — 3079F DIAST BP 80-89 MM HG: CPT | Performed by: INTERNAL MEDICINE

## 2022-12-08 PROCEDURE — 3074F SYST BP LT 130 MM HG: CPT | Performed by: INTERNAL MEDICINE

## 2022-12-08 PROCEDURE — 36415 COLL VENOUS BLD VENIPUNCTURE: CPT | Performed by: INTERNAL MEDICINE

## 2022-12-08 PROCEDURE — 3008F BODY MASS INDEX DOCD: CPT | Performed by: INTERNAL MEDICINE

## 2022-12-08 PROCEDURE — 99214 OFFICE O/P EST MOD 30 MIN: CPT | Performed by: INTERNAL MEDICINE

## 2022-12-08 RX ORDER — DOCUSATE SODIUM 100 MG/1
100 CAPSULE, LIQUID FILLED ORAL 2 TIMES DAILY PRN
Qty: 30 CAPSULE | Refills: 0 | Status: SHIPPED | OUTPATIENT
Start: 2022-12-08

## 2023-01-10 RX ORDER — DOCUSATE SODIUM 100 MG/1
CAPSULE, LIQUID FILLED ORAL
Qty: 30 CAPSULE | Refills: 0 | Status: SHIPPED | OUTPATIENT
Start: 2023-01-10

## 2023-01-10 NOTE — TELEPHONE ENCOUNTER
Refill passed per [email or web address] [facility] [facility] protocol. Short-term prescription of #30 R-0 sent 12/08/22, please review. Thank you. Per progress note by Dr. Maida Fleischer 12/08/22:  \"Constipation, unspecified constipation type of Colace take as prescribed take the cholesterol medication if symptoms recur let us know  Then we might have to change cholesterol medication medication\"    Requested Prescriptions   Pending Prescriptions Disp Refills    DOCUSATE SODIUM 100 MG Oral Cap [Pharmacy Med Name: DOCUSATE SODIUM 100 MG SOFTGEL] 30 capsule 0     Sig: TAKE 1 CAPSULE BY MOUTH 2 TIMES DAILY AS NEEDED FOR CONSTIPATION.        Gastrointestional Medication Protocol Passed - 1/8/2023  3:33 PM        Passed - In person appointment or virtual visit in the past 12 mos or appointment in next 3 mos     Recent Outpatient Visits              1 month ago Primary hypertension    [place] [facility] [facility], [address], [place], Donal Ortiz MD    Office Visit    4 months ago Annual physical exam    [address] [facility], Rocio Taylor MD    Office Visit    5 months ago Pre-diabetes    [facility], [address], Rocio Taylor MD    Office Visit    8 months ago Short-term memory loss    [address], [address]    Office Visit    8 months ago Abnormal TSH    [address], Rocio Taylor MD    Office Visit          Future Appointments         Provider Department Appt Notes    In 1 month Kelly Beams, [address] [facility] f/u    In 1 month Guy Hyde MD [facility], [address] 3 month fu                     Recent Outpatient Visits              1 month ago Primary hypertension    [address] [facility], Rocio Taylor MD    Office Visit    4 months ago Annual physical exam    Radha Escalante MD    Office Visit    5 months ago [address], [address] Panfilo Wilkes MD    Office Visit    8 months ago Short-term memory loss    [place], [place]    Office Visit    8 months ago Abnormal TSH    [address], Makenzie Harp MD    Office Visit            Future Appointments         Provider Department Appt Notes    In 1 month [facility] f/u    In 1 month Panfilo Wilkes MD [facility], [address], [place] 3 month fu

## 2023-02-16 ENCOUNTER — OFFICE VISIT (OUTPATIENT)
Dept: INTERNAL MEDICINE CLINIC | Facility: CLINIC | Age: 63
End: 2023-02-16

## 2023-02-16 VITALS
HEART RATE: 50 BPM | OXYGEN SATURATION: 96 % | BODY MASS INDEX: 40.16 KG/M2 | RESPIRATION RATE: 17 BRPM | HEIGHT: 73 IN | WEIGHT: 303 LBS | DIASTOLIC BLOOD PRESSURE: 64 MMHG | TEMPERATURE: 98 F | SYSTOLIC BLOOD PRESSURE: 126 MMHG

## 2023-02-16 DIAGNOSIS — E11.9 TYPE 2 DIABETES MELLITUS WITHOUT COMPLICATION, WITHOUT LONG-TERM CURRENT USE OF INSULIN (HCC): ICD-10-CM

## 2023-02-16 DIAGNOSIS — M79.604 RIGHT LEG PAIN: Primary | ICD-10-CM

## 2023-02-16 PROBLEM — E66.01 MORBID (SEVERE) OBESITY DUE TO EXCESS CALORIES (HCC): Status: ACTIVE | Noted: 2023-02-16

## 2023-02-16 LAB
EST. AVERAGE GLUCOSE BLD GHB EST-MCNC: 137 MG/DL (ref 68–126)
HBA1C MFR BLD: 6.4 % (ref ?–5.7)

## 2023-02-16 PROCEDURE — 3078F DIAST BP <80 MM HG: CPT | Performed by: INTERNAL MEDICINE

## 2023-02-16 PROCEDURE — 99214 OFFICE O/P EST MOD 30 MIN: CPT | Performed by: INTERNAL MEDICINE

## 2023-02-16 PROCEDURE — 3044F HG A1C LEVEL LT 7.0%: CPT | Performed by: INTERNAL MEDICINE

## 2023-02-16 PROCEDURE — 3074F SYST BP LT 130 MM HG: CPT | Performed by: INTERNAL MEDICINE

## 2023-02-16 PROCEDURE — 3008F BODY MASS INDEX DOCD: CPT | Performed by: INTERNAL MEDICINE

## 2023-02-16 RX ORDER — BACLOFEN 5 MG/1
5 TABLET ORAL NIGHTLY PRN
Qty: 30 TABLET | Refills: 0 | Status: SHIPPED | OUTPATIENT
Start: 2023-02-16 | End: 2023-03-18

## 2023-02-16 RX ORDER — METHYLPREDNISOLONE 4 MG/1
TABLET ORAL
Qty: 1 EACH | Refills: 0 | Status: SHIPPED | OUTPATIENT
Start: 2023-02-16

## 2023-04-06 RX ORDER — BACLOFEN 5 MG/1
5 TABLET ORAL NIGHTLY PRN
Qty: 30 TABLET | Refills: 0 | Status: SHIPPED | OUTPATIENT
Start: 2023-04-06

## 2023-04-06 NOTE — TELEPHONE ENCOUNTER
Please review refill protocol failed/ no protocol  Requested Prescriptions   Pending Prescriptions Disp Refills    BACLOFEN 5 MG Oral Tab [Pharmacy Med Name: BACLOFEN 5 MG TABLET] 30 tablet 0     Sig: TAKE 1 TABLET BY MOUTH NIGHTLY AS NEEDED.        There is no refill protocol information for this order

## 2023-05-01 ENCOUNTER — MED REC SCAN ONLY (OUTPATIENT)
Dept: PHYSICAL MEDICINE AND REHAB | Facility: CLINIC | Age: 63
End: 2023-05-01

## 2023-05-02 RX ORDER — BACLOFEN 5 MG/1
5 TABLET ORAL NIGHTLY PRN
Qty: 30 TABLET | Refills: 0 | Status: SHIPPED | OUTPATIENT
Start: 2023-05-02

## 2023-05-02 NOTE — TELEPHONE ENCOUNTER
Please review; protocol failed. Or has no protocol    Requested Prescriptions   Pending Prescriptions Disp Refills    BACLOFEN 5 MG Oral Tab [Pharmacy Med Name: BACLOFEN 5 MG TABLET] 30 tablet 0     Sig: TAKE 1 TABLET BY MOUTH NIGHTLY AS NEEDED.        There is no refill protocol information for this order           Recent Outpatient Visits              2 months ago Right leg pain    [facility], [address], Hattie Alonzo MD    Office Visit    2 months ago MCI (mild cognitive impairment)    [address] [facility]    Office Visit    4 months ago Primary hypertension    [address] [facility], Hattie Alonzo MD    Office Visit    8 months ago Annual physical exam    [address] [place], [place] [address]Hattie MD    Office Visit    9 months ago Joaquín Grayson, Hattie Alonzo MD    Office Visit           Future Appointments         Provider Department Appt Notes    In 1 month Brenton Calabrese MD [address] 3 month follow up/policy informed to pt

## 2023-05-29 RX ORDER — BACLOFEN 5 MG/1
5 TABLET ORAL NIGHTLY PRN
Qty: 30 TABLET | Refills: 0 | Status: SHIPPED | OUTPATIENT
Start: 2023-05-29

## 2023-05-29 NOTE — TELEPHONE ENCOUNTER
Please review. Protocol Failed or has No Protocol. Requested Prescriptions   Pending Prescriptions Disp Refills    BACLOFEN 5 MG Oral Tab [Pharmacy Med Name: BACLOFEN 5 MG TABLET] 30 tablet 0     Sig: TAKE 1 TABLET BY MOUTH NIGHTLY AS NEEDED.        There is no refill protocol information for this order          Future Appointments         Provider Department Appt Notes    In 3 weeks Elli Swain MD [facility], [address], [place] 3 month follow up/policy informed to pt            Recent Outpatient Visits              3 months ago Right leg pain    Delaney Gonzalez MD    Office Visit    3 months ago MCI (mild cognitive impairment)    [address], [address], [facility],     Office Visit    5 months ago Primary hypertension    Delaney Gonzalez MD    Office Visit    9 months ago Annual physical exam    Delaney Gonzalez MD    Office Visit    10 months ago Laurie Ho Marvine Perfect, MD    Office Visit

## 2023-06-24 ENCOUNTER — OFFICE VISIT (OUTPATIENT)
Dept: INTERNAL MEDICINE CLINIC | Facility: CLINIC | Age: 63
End: 2023-06-24

## 2023-06-24 VITALS
HEART RATE: 78 BPM | WEIGHT: 301 LBS | RESPIRATION RATE: 18 BRPM | TEMPERATURE: 98 F | BODY MASS INDEX: 39.89 KG/M2 | DIASTOLIC BLOOD PRESSURE: 78 MMHG | SYSTOLIC BLOOD PRESSURE: 128 MMHG | OXYGEN SATURATION: 98 % | HEIGHT: 73 IN

## 2023-06-24 DIAGNOSIS — I10 PRIMARY HYPERTENSION: ICD-10-CM

## 2023-06-24 DIAGNOSIS — I65.1 BASILAR ARTERY STENOSIS: ICD-10-CM

## 2023-06-24 DIAGNOSIS — E11.9 TYPE 2 DIABETES MELLITUS WITHOUT COMPLICATION, WITHOUT LONG-TERM CURRENT USE OF INSULIN (HCC): Primary | ICD-10-CM

## 2023-06-24 DIAGNOSIS — I25.118 CORONARY ARTERY DISEASE OF NATIVE ARTERY OF NATIVE HEART WITH STABLE ANGINA PECTORIS (HCC): ICD-10-CM

## 2023-06-24 DIAGNOSIS — Z86.73 HISTORY OF CVA (CEREBROVASCULAR ACCIDENT): ICD-10-CM

## 2023-06-24 PROCEDURE — 99214 OFFICE O/P EST MOD 30 MIN: CPT | Performed by: INTERNAL MEDICINE

## 2023-06-24 PROCEDURE — 90471 IMMUNIZATION ADMIN: CPT | Performed by: INTERNAL MEDICINE

## 2023-06-24 PROCEDURE — 3074F SYST BP LT 130 MM HG: CPT | Performed by: INTERNAL MEDICINE

## 2023-06-24 PROCEDURE — 90715 TDAP VACCINE 7 YRS/> IM: CPT | Performed by: INTERNAL MEDICINE

## 2023-06-24 PROCEDURE — 3078F DIAST BP <80 MM HG: CPT | Performed by: INTERNAL MEDICINE

## 2023-06-24 PROCEDURE — 3008F BODY MASS INDEX DOCD: CPT | Performed by: INTERNAL MEDICINE

## 2023-07-10 ENCOUNTER — TELEPHONE (OUTPATIENT)
Dept: INTERNAL MEDICINE CLINIC | Facility: CLINIC | Age: 63
End: 2023-07-10

## 2023-07-10 NOTE — TELEPHONE ENCOUNTER
He is up-to-date with his Tdap. Shingles vaccine would be 1 and the pneumonia 20 would be another vaccine. Shingles I am not sure if his insurance covers. He is does need to check. But the pneumonia 20 we can do an office. It is better to pull aside the provider and asked them by the having the patient come back again and having them leave.

## 2023-07-10 NOTE — TELEPHONE ENCOUNTER
Patient came in for a nurse visit today stating he was supposed to get a couple of vaccines, no orders for any vaccines in patient chart, PCP and MA are on vacation, informed patient that I will put note in his chart and will call him when Dr. Edelmira Pittman returns from vacation on 7/24/23.

## 2023-08-24 RX ORDER — ASPIRIN 81 MG/1
TABLET, COATED ORAL
Qty: 90 TABLET | Refills: 3 | Status: SHIPPED | OUTPATIENT
Start: 2023-08-24

## 2023-08-24 NOTE — TELEPHONE ENCOUNTER
Requested Prescriptions     Pending Prescriptions Disp Refills    ASPIRIN LOW DOSE 81 MG Oral Tab EC [Pharmacy Med Name: ASPIRIN EC 81 MG TABLET] 90 tablet 3     Sig: TAKE 1 TABLET BY MOUTH EVERY DAY         Last OV: 2/16/23  Next OV: None  Last refilled: 6/21/22 #90 with 3 refills

## 2023-09-13 ENCOUNTER — TELEPHONE (OUTPATIENT)
Dept: NEUROLOGY | Facility: CLINIC | Age: 63
End: 2023-09-13

## 2023-09-13 ENCOUNTER — NURSE TRIAGE (OUTPATIENT)
Dept: INTERNAL MEDICINE CLINIC | Facility: CLINIC | Age: 63
End: 2023-09-13

## 2023-09-19 ENCOUNTER — OFFICE VISIT (OUTPATIENT)
Dept: INTERNAL MEDICINE CLINIC | Facility: CLINIC | Age: 63
End: 2023-09-19

## 2023-09-19 VITALS
WEIGHT: 302 LBS | OXYGEN SATURATION: 98 % | TEMPERATURE: 98 F | HEIGHT: 73 IN | HEART RATE: 90 BPM | DIASTOLIC BLOOD PRESSURE: 82 MMHG | SYSTOLIC BLOOD PRESSURE: 126 MMHG | RESPIRATION RATE: 18 BRPM | BODY MASS INDEX: 40.02 KG/M2

## 2023-09-19 DIAGNOSIS — G89.29 CHRONIC PAIN OF RIGHT ANKLE: Primary | ICD-10-CM

## 2023-09-19 DIAGNOSIS — I10 PRIMARY HYPERTENSION: ICD-10-CM

## 2023-09-19 DIAGNOSIS — E11.9 TYPE 2 DIABETES MELLITUS WITHOUT COMPLICATION, WITHOUT LONG-TERM CURRENT USE OF INSULIN (HCC): ICD-10-CM

## 2023-09-19 DIAGNOSIS — M25.571 CHRONIC PAIN OF RIGHT ANKLE: Primary | ICD-10-CM

## 2023-09-19 DIAGNOSIS — R19.7 DIARRHEA, UNSPECIFIED TYPE: ICD-10-CM

## 2023-09-19 DIAGNOSIS — E11.9 COMPREHENSIVE DIABETIC FOOT EXAMINATION, TYPE 2 DM, ENCOUNTER FOR (HCC): ICD-10-CM

## 2023-09-19 LAB
CHOLEST SERPL-MCNC: 111 MG/DL (ref ?–200)
CREAT UR-SCNC: 101 MG/DL
FASTING PATIENT LIPID ANSWER: YES
HDLC SERPL-MCNC: 34 MG/DL (ref 40–59)
LDLC SERPL CALC-MCNC: 55 MG/DL (ref ?–100)
MICROALBUMIN UR-MCNC: <0.5 MG/DL
NONHDLC SERPL-MCNC: 77 MG/DL (ref ?–130)
TRIGL SERPL-MCNC: 118 MG/DL (ref 30–149)
VLDLC SERPL CALC-MCNC: 17 MG/DL (ref 0–30)

## 2023-09-19 PROCEDURE — 3079F DIAST BP 80-89 MM HG: CPT | Performed by: INTERNAL MEDICINE

## 2023-09-19 PROCEDURE — 3008F BODY MASS INDEX DOCD: CPT | Performed by: INTERNAL MEDICINE

## 2023-09-19 PROCEDURE — 99214 OFFICE O/P EST MOD 30 MIN: CPT | Performed by: INTERNAL MEDICINE

## 2023-09-19 PROCEDURE — 3074F SYST BP LT 130 MM HG: CPT | Performed by: INTERNAL MEDICINE

## 2023-09-19 PROCEDURE — 90471 IMMUNIZATION ADMIN: CPT | Performed by: INTERNAL MEDICINE

## 2023-09-19 PROCEDURE — 90750 HZV VACC RECOMBINANT IM: CPT | Performed by: INTERNAL MEDICINE

## 2023-09-20 LAB
EST. AVERAGE GLUCOSE BLD GHB EST-MCNC: 134 MG/DL (ref 68–126)
HBA1C MFR BLD: 6.3 % (ref ?–5.7)

## 2023-09-21 NOTE — PROGRESS NOTES
Subjective:     Patient ID: Avery Burnette is a 70year old male. HPI    Patient comes in for follow-up complaining today of ongoing right ankle foot pain on and off lately has been hurting every day no injury has been has been hurting for over a month, no swelling. Patient also with complaint of diarrhea lasting 2- 3 days goes away and then comes back has been going on for multiple no recent travel no fever  No recent antibiotic use  History/Other:   Review of Systems   Constitutional: Negative. HENT: Negative. Eyes: Negative. Respiratory: Negative. Cardiovascular: Negative. Gastrointestinal:  Positive for diarrhea. Negative for abdominal pain. Genitourinary: Negative. Musculoskeletal: Negative. Rt ankle pain   Skin: Negative. Neurological: Negative. Psychiatric/Behavioral: Negative. Current Outpatient Medications   Medication Sig Dispense Refill    ASPIRIN LOW DOSE 81 MG Oral Tab EC TAKE 1 TABLET BY MOUTH EVERY DAY 90 tablet 3    baclofen 5 MG Oral Tab Take 1 tablet (5 mg total) by mouth nightly as needed. 30 tablet 0    docusate sodium 100 MG Oral Cap Take 1 capsule (100 mg total) by mouth 2 (two) times daily as needed for constipation. 30 capsule 1    METFORMIN 500 MG Oral Tab TAKE 1 TABLET BY MOUTH TWICE A DAY WITH MEALS 180 tablet 1    atorvastatin 80 MG Oral Tab Take 1 tablet (80 mg total) by mouth nightly. 90 tablet 1    metoprolol succinate  MG Oral Tablet 24 Hr Take 1 tablet (100 mg total) by mouth Daily Beta Blocker. 90 tablet 1    amLODIPine Besy-Benazepril HCl 5-20 MG Oral Cap       ergocalciferol 1.25 MG (48217 UT) Oral Cap Take 1 capsule (50,000 Units total) by mouth once a week. 12 capsule 0    Clopidogrel Bisulfate 75 MG Oral Tab Take 1 tablet (75 mg total) by mouth daily.  30 tablet 0     Allergies:  Dust Mite Extract       Coughing    Past Medical History:   Diagnosis Date    History of heart artery stent 06/22/2021    Stroke [facility])     Angelika 2021      No past surgical history on file. No family history on file. Social History:   Social History     Socioeconomic History    Marital status:    Tobacco Use    Smoking status: Never    Smokeless tobacco: Never   Substance and Sexual Activity    Alcohol use: Not Currently    Drug use: Never   Other Topics Concern    Caffeine Concern Yes     Comment: coffee    Exercise Yes     Comment: walking        Objective:   Physical Exam  Vitals and nursing note reviewed. Constitutional:       Appearance: He is well-developed. HENT:      Head: Normocephalic and atraumatic. Right Ear: External ear normal.      Left Ear: External ear normal.      Nose: Nose normal.   Eyes:      Conjunctiva/sclera: Conjunctivae normal.      Pupils: Pupils are equal, round, and reactive to light. Cardiovascular:      Rate and Rhythm: Normal rate and regular rhythm. Heart sounds: Normal heart sounds. Pulmonary:      Effort: Pulmonary effort is normal.      Breath sounds: Normal breath sounds. Abdominal:      General: Bowel sounds are normal.      Palpations: Abdomen is soft. Genitourinary:     Penis: Normal.       Prostate: Normal.      Rectum: Normal.   Musculoskeletal:         General: Normal range of motion. Cervical back: Normal range of motion and neck supple. Comments:   Right ankle pain not much swelling    Bilateral barefoot skin diabetic exam is normal, visualized feet and the appearance is normal.  Bilateral monofilament/sensation of both feet is normal.  Pulsation pedal pulse exam of both lower legs/feet is normal as well. Skin:     General: Skin is warm and dry. Neurological:      Mental Status: He is alert and oriented to person, place, and time. Deep Tendon Reflexes: Reflexes are normal and symmetric.          Assessment & Plan:   Chronic pain of right ankle  (primary encounter diagnosis) use a splint as need medication for pain ice let us know if not better  Diarrhea, unspecified type- ? We will order stool culture as needed Imodium watch diet let us know if not better  Type 2 diabetes mellitus without complication, without long-term current use of insulin ([facility] [address] continue current treatment  Primary hypertension well-controlled continue current treatment  Comprehensive diabetic foot examination, type 2 DM, encounter for ([facility])Bilateral barefoot skin diabetic exam is normal, visualized feet and the appearance is normal.  Bilateral monofilament/sensation of both feet is normal.  Pulsation pedal pulse exam of both lower legs/feet is normal as well.     Orders Placed This Encounter      Zoster Recombinant Adjuvanted (Shingrix -Shingles) [75852]      Stool Culture [E]      Ova and Parasites (non-traveler) [E]      Meds This Visit:  Requested Prescriptions      No prescriptions requested or ordered in this encounter       Imaging & Referrals:  ZOSTER VACC RECOMBINANT IM NJX  OPHTHALMOLOGY - INTERNAL

## 2024-01-04 ENCOUNTER — OFFICE VISIT (OUTPATIENT)
Dept: INTERNAL MEDICINE CLINIC | Facility: CLINIC | Age: 64
End: 2024-01-04

## 2024-01-04 VITALS
HEIGHT: 73 IN | SYSTOLIC BLOOD PRESSURE: 120 MMHG | WEIGHT: 300 LBS | RESPIRATION RATE: 18 BRPM | OXYGEN SATURATION: 98 % | HEART RATE: 78 BPM | DIASTOLIC BLOOD PRESSURE: 70 MMHG | BODY MASS INDEX: 39.76 KG/M2 | TEMPERATURE: 98 F

## 2024-01-04 DIAGNOSIS — T50.Z95A DIARRHEA AFTER VACCINATION: Primary | ICD-10-CM

## 2024-01-04 DIAGNOSIS — T88.7XXA MEDICATION SIDE EFFECT: ICD-10-CM

## 2024-01-04 DIAGNOSIS — N52.8 OTHER MALE ERECTILE DYSFUNCTION: ICD-10-CM

## 2024-01-04 DIAGNOSIS — R19.7 DIARRHEA AFTER VACCINATION: Primary | ICD-10-CM

## 2024-01-04 PROCEDURE — 3074F SYST BP LT 130 MM HG: CPT | Performed by: INTERNAL MEDICINE

## 2024-01-04 PROCEDURE — 3078F DIAST BP <80 MM HG: CPT | Performed by: INTERNAL MEDICINE

## 2024-01-04 PROCEDURE — 3008F BODY MASS INDEX DOCD: CPT | Performed by: INTERNAL MEDICINE

## 2024-01-04 PROCEDURE — 99214 OFFICE O/P EST MOD 30 MIN: CPT | Performed by: INTERNAL MEDICINE

## 2024-01-04 PROCEDURE — 90750 HZV VACC RECOMBINANT IM: CPT | Performed by: INTERNAL MEDICINE

## 2024-01-04 PROCEDURE — 90471 IMMUNIZATION ADMIN: CPT | Performed by: INTERNAL MEDICINE

## 2024-01-04 RX ORDER — SILDENAFIL 100 MG/1
100 TABLET, FILM COATED ORAL AS NEEDED
Qty: 9 TABLET | Refills: 2 | Status: SHIPPED | OUTPATIENT
Start: 2024-01-04

## 2024-01-04 RX ORDER — GLIPIZIDE 2.5 MG/1
2.5 TABLET, EXTENDED RELEASE ORAL
Qty: 30 TABLET | Refills: 2 | Status: SHIPPED | OUTPATIENT
Start: 2024-01-04

## 2024-01-11 ENCOUNTER — TELEPHONE (OUTPATIENT)
Dept: SURGERY | Facility: CLINIC | Age: 64
End: 2024-01-11

## 2024-01-11 NOTE — TELEPHONE ENCOUNTER
did spouses sx Forms for short term disability received from [employer] forwarded to forms department copy of form scanned and forwarded to forms department thru outlook, original sent thru intercompany mail.

## 2024-01-11 NOTE — PROGRESS NOTES
Subjective:     Patient ID: Juan Walker is a 63 year old male.    HPI    Patient comes in today for follow-up was seen in September for complaint of loose stools he states continues to have loose stools he never did the stool test.  Patient is taking metformin and all the symptoms, started when he started metformin.  Patient also needs refill on his medication for erectile dysfunction he has hard time getting erection but sometimes lasting also    History/Other:   Review of Systems   Constitutional: Negative.    HENT: Negative.     Eyes: Negative.    Respiratory: Negative.     Cardiovascular: Negative.    Gastrointestinal:  Positive for diarrhea.   Genitourinary: Negative.         Erectile dysfunction   Musculoskeletal: Negative.    Skin: Negative.    Neurological: Negative.    Psychiatric/Behavioral: Negative.       Current Outpatient Medications   Medication Sig Dispense Refill    glipiZIDE ER 2.5 MG Oral Tablet 24 Hr Take 1 tablet (2.5 mg total) by mouth daily with breakfast. 30 tablet 2    Sildenafil Citrate (VIAGRA) 100 MG Oral Tab Take 1 tablet (100 mg total) by mouth as needed for Erectile Dysfunction. 9 tablet 2    metFORMIN 500 MG Oral Tab Take 1 tablet (500 mg total) by mouth 2 (two) times daily with meals. 180 tablet 3    ASPIRIN LOW DOSE 81 MG Oral Tab EC TAKE 1 TABLET BY MOUTH EVERY DAY 90 tablet 3    baclofen 5 MG Oral Tab Take 1 tablet (5 mg total) by mouth nightly as needed. 30 tablet 0    docusate sodium 100 MG Oral Cap Take 1 capsule (100 mg total) by mouth 2 (two) times daily as needed for constipation. 30 capsule 1    atorvastatin 80 MG Oral Tab Take 1 tablet (80 mg total) by mouth nightly. 90 tablet 1    metoprolol succinate  MG Oral Tablet 24 Hr Take 1 tablet (100 mg total) by mouth Daily Beta Blocker. 90 tablet 1    amLODIPine Besy-Benazepril HCl 5-20 MG Oral Cap       ergocalciferol 1.25 MG (60369 UT) Oral Cap Take 1 capsule (50,000 Units total) by mouth once a week. 12 capsule 0     Clopidogrel Bisulfate 75 MG Oral Tab Take 1 tablet (75 mg total) by mouth daily. 30 tablet 0     Allergies:  Allergies   Allergen Reactions    Dust Mite Extract Coughing       Past Medical History:   Diagnosis Date    History of heart artery stent 06/22/2021    Stroke (HCC)     June 2021      No past surgical history on file.   No family history on file.   Social History:   Social History     Socioeconomic History    Marital status:    Tobacco Use    Smoking status: Never    Smokeless tobacco: Never   Substance and Sexual Activity    Alcohol use: Not Currently    Drug use: Never   Other Topics Concern    Caffeine Concern Yes     Comment: coffee    Exercise Yes     Comment: walking        Objective:   Physical Exam  Vitals and nursing note reviewed.   Constitutional:       Appearance: He is well-developed.   HENT:      Head: Normocephalic and atraumatic.      Right Ear: External ear normal.      Left Ear: External ear normal.      Nose: Nose normal.   Eyes:      Conjunctiva/sclera: Conjunctivae normal.      Pupils: Pupils are equal, round, and reactive to light.   Cardiovascular:      Rate and Rhythm: Normal rate and regular rhythm.      Heart sounds: Normal heart sounds.   Pulmonary:      Effort: Pulmonary effort is normal.      Breath sounds: Normal breath sounds.   Abdominal:      General: Bowel sounds are normal.      Palpations: Abdomen is soft.   Genitourinary:     Penis: Normal.       Prostate: Normal.      Rectum: Normal.   Musculoskeletal:         General: Normal range of motion.      Cervical back: Normal range of motion and neck supple.   Skin:     General: Skin is warm and dry.   Neurological:      Mental Status: He is alert and oriented to person, place, and time.      Deep Tendon Reflexes: Reflexes are normal and symmetric.         Assessment & Plan:   1. Diarrhea after vaccination -up to metformin we will monitor started on glipizide for the diabetes monitor sugars   2. Medication side effect  as above as above   3. Other male erectile dysfunction we will give medication take as needed as prescribed       Orders Placed This Encounter   Procedures    Zoster Recombinant Adjuvanted (Shingrix -Shingles) [33162]       Meds This Visit:  Requested Prescriptions     Signed Prescriptions Disp Refills    glipiZIDE ER 2.5 MG Oral Tablet 24 Hr 30 tablet 2     Sig: Take 1 tablet (2.5 mg total) by mouth daily with breakfast.    Sildenafil Citrate (VIAGRA) 100 MG Oral Tab 9 tablet 2     Sig: Take 1 tablet (100 mg total) by mouth as needed for Erectile Dysfunction.       Imaging & Referrals:  ZOSTER VACC RECOMBINANT IM NJX

## 2024-01-30 RX ORDER — GLIPIZIDE 2.5 MG/1
2.5 TABLET, EXTENDED RELEASE ORAL
Qty: 90 TABLET | Refills: 0 | OUTPATIENT
Start: 2024-01-30

## 2024-01-30 RX ORDER — DOCUSATE SODIUM 100 MG/1
100 CAPSULE, LIQUID FILLED ORAL 2 TIMES DAILY PRN
Qty: 60 CAPSULE | Refills: 1 | Status: SHIPPED | OUTPATIENT
Start: 2024-01-30

## 2024-01-30 NOTE — TELEPHONE ENCOUNTER
Refill passed per [facility] protocol.  Office visit 01/04/2024: Patient comes in today for follow-up was seen in September for complaint of loose stools he states continues to have loose stools   Is Refill appropriate?  Requested Prescriptions   Pending Prescriptions Disp Refills    DOCUSATE SODIUM 100 MG Oral Cap [Pharmacy Med Name: DOCUSATE SODIUM 100 MG SOFTGEL] 30 capsule 1     Sig: TAKE 1 CAPSULE BY MOUTH 2 TIMES DAILY AS NEEDED FOR CONSTIPATION.       Gastrointestional Medication Protocol Passed - 1/28/2024  2:47 PM        Passed - In person appointment or virtual visit in the past 12 mos or appointment in next 3 mos     Recent Outpatient Visits              [facility]    4 months ago [facility] pain of right ankle    [facility]    [facility]    7 months ago Type 2 diabetes mellitus without complication, without long-term current use of insulin ([facility]    [facility]    11 months ago Right leg pain    [facility]    [facility]    11 months ago MCI (mild cognitive impairment)    [facility]                       Refused Prescriptions Disp Refills    GLIPIZIDE ER 2.5 MG Oral Tablet 24 Hr [Pharmacy Med Name: GLIPIZIDE ER 2.5 MG TABLET] 90 tablet 0     Sig: TAKE 1 TABLET BY MOUTH DAILY WITH BREAKFAST.       Diabetes Medication Protocol Failed - 1/28/2024  2:47 PM        Failed - EGFRCR or GFRAA > 50     GFR Evaluation            Failed - GFR in the past 12 months        Passed - Last A1C < 7.5 and within past 6 months     Lab Results   Component Value Date    A1C 6.3 (H) 09/19/2023             Passed - In person appointment  or virtual visit in the past 6 mos or appointment in next 3 mos     Recent Outpatient Visits              3 weeks ago Diarrhea after vaccination    [facility], Christian Murray MD    Office Visit    4 months ago Chronic pain of right ankle    [facility], Christian Murray MD    Office Visit    7 months ago Type 2 diabetes mellitus without complication, without long-term current use of insulin ([facility])    [facility], Christian Murray MD    Office Visit    11 months ago Right leg pain    [facility], Christian Murray MD    Office Visit    11 months ago MCI (mild cognitive impairment)    [facility], Rainer Scott DO    Office Visit                           Recent Outpatient Visits              3 weeks ago Diarrhea after vaccination    [facility], Christian Murray MD    Office Visit    4 months ago Chronic pain of right ankle    [facility], Christian Murray MD    Office Visit    7 months ago Type 2 diabetes mellitus without complication, without long-term current use of insulin ([facility])    [facility], Christian Murray MD    Office Visit    11 months ago Right leg pain    [facility]    [facility]    11 months ago MCI (mild cognitive impairment)    [facility]    [facility] Visit

## 2024-01-31 NOTE — TELEPHONE ENCOUNTER
Patient calling stating that [facility] pharmacy has been trying to contact our office in regards to his medication  Patient is not sure of which medications and requesting for pharmacy to be contacted.    Spoke to Shawn, states needs glipizide set up for March for a 90 day refill as they automatically used patient's additional refills on current script to make a 90 day supply so patient has no further refills.    New script set up and sent to run protocol

## 2024-02-01 RX ORDER — GLIPIZIDE 2.5 MG/1
2.5 TABLET, EXTENDED RELEASE ORAL
Qty: 90 TABLET | Refills: 3 | Status: SHIPPED | OUTPATIENT
Start: 2024-02-01

## 2024-02-01 NOTE — TELEPHONE ENCOUNTER
See Stephanie's note below;  Stephanie Martinez, RN Registered Nurse SignedYesterday     \"Spoke to Shawn, states needs glipizide set up for March for a 90 day refill as they automatically used patient's additional refills on current script to make a 90 day supply so patient has no further refills. \"      Please review; protocol failed/no protocol.     Requested Prescriptions   Pending Prescriptions Disp Refills    glipiZIDE ER 2.5 MG Oral Tablet 24 Hr 90 tablet 3     Sig: Take 1 tablet (2.5 mg total) by mouth daily with breakfast.       Diabetes Medication Protocol Failed - 1/31/2024 11:48 AM        Failed - EGFRCR or GFRAA > 50     GFR Evaluation            Failed - GFR in the past 12 months        Passed - Last A1C < 7.5 and within past 6 months     Lab Results   Component Value Date    A1C 6.3 (H) 09/19/2023             Passed - In person appointment or virtual visit in the past 6 mos or appointment in next 3 mos     Recent Outpatient Visits              [facility]    4 months ago Chronic pain of right ankle    [facility]    [facility]    7 months ago Type 2 diabetes mellitus without complication, without long-term current use of insulin (HCC)    [facility]    [facility]    11 months ago Right leg pain    [facility]    [facility] Visit    11 months ago MCI (mild cognitive impairment)    [facility]    [facility]                            Recent Outpatient Visits              4 weeks ago Diarrhea after vaccination    [facility]    [facility]    4 months ago Chronic pain of right ankle    [place]  [facility]    [facility] Visit    7 months ago Type 2 diabetes mellitus without complication, without long-term current use of insulin (HCC)    [facility]    [facility]    11 months [facility]    [facility]    11 months ago MCI (mild cognitive impairment)    [facility]     [facility]

## 2024-02-17 ENCOUNTER — TELEPHONE (OUTPATIENT)
Dept: INTERNAL MEDICINE CLINIC | Facility: CLINIC | Age: 64
End: 2024-02-17

## 2024-04-03 ENCOUNTER — TELEPHONE (OUTPATIENT)
Dept: INTERNAL MEDICINE CLINIC | Facility: CLINIC | Age: 64
End: 2024-04-03

## 2024-04-18 ENCOUNTER — OFFICE VISIT (OUTPATIENT)
Dept: INTERNAL MEDICINE CLINIC | Facility: CLINIC | Age: 64
End: 2024-04-18
Payer: COMMERCIAL

## 2024-04-18 VITALS
BODY MASS INDEX: 41.22 KG/M2 | DIASTOLIC BLOOD PRESSURE: 68 MMHG | WEIGHT: 311 LBS | RESPIRATION RATE: 17 BRPM | HEART RATE: 67 BPM | SYSTOLIC BLOOD PRESSURE: 126 MMHG | OXYGEN SATURATION: 98 % | HEIGHT: 73 IN | TEMPERATURE: 98 F

## 2024-04-18 DIAGNOSIS — Z01.818 PRE-OP EVALUATION: Primary | ICD-10-CM

## 2024-04-18 PROCEDURE — 3074F SYST BP LT 130 MM HG: CPT | Performed by: INTERNAL MEDICINE

## 2024-04-18 PROCEDURE — 3078F DIAST BP <80 MM HG: CPT | Performed by: INTERNAL MEDICINE

## 2024-04-18 PROCEDURE — 3008F BODY MASS INDEX DOCD: CPT | Performed by: INTERNAL MEDICINE

## 2024-04-18 PROCEDURE — 99214 OFFICE O/P EST MOD 30 MIN: CPT | Performed by: INTERNAL MEDICINE

## 2024-04-18 NOTE — PROGRESS NOTES
Subjective:   Patient ID: Juan Walker is a 63 year old male.    HPI      Patient comes in today needing a clearance for dental procedure he needs to have 2 of his wisdom teeth removed under local anesthesia but because he has a history of heart disease and high blood pressure dentist wants to make sure he is okay with that.  Patient denies any chest pain shortness of breath follows with cardiology has been doing okay blood pressure well-controlled    History/Other:   Review of Systems   Constitutional: Negative.    HENT: Negative.     Eyes: Negative.    Respiratory: Negative.     Cardiovascular: Negative.    Gastrointestinal: Negative.    Genitourinary: Negative.    Musculoskeletal: Negative.    Skin: Negative.    Neurological: Negative.    Psychiatric/Behavioral: Negative.       Current Outpatient Medications   Medication Sig Dispense Refill    glipiZIDE ER 2.5 MG Oral Tablet 24 Hr Take 1 tablet (2.5 mg total) by mouth daily with breakfast. 90 tablet 3    docusate sodium 100 MG Oral Cap Take 1 capsule (100 mg total) by mouth 2 (two) times daily as needed. 60 capsule 1    Sildenafil Citrate (VIAGRA) 100 MG Oral Tab Take 1 tablet (100 mg total) by mouth as needed for Erectile Dysfunction. 9 tablet 2    metFORMIN 500 MG Oral Tab Take 1 tablet (500 mg total) by mouth 2 (two) times daily with meals. 180 tablet 3    ASPIRIN LOW DOSE 81 MG Oral Tab EC TAKE 1 TABLET BY MOUTH EVERY DAY 90 tablet 3    baclofen 5 MG Oral Tab Take 1 tablet (5 mg total) by mouth nightly as needed. 30 tablet 0    atorvastatin 80 MG Oral Tab Take 1 tablet (80 mg total) by mouth nightly. 90 tablet 1    metoprolol succinate  MG Oral Tablet 24 Hr Take 1 tablet (100 mg total) by mouth Daily Beta Blocker. 90 tablet 1    amLODIPine Besy-Benazepril HCl 5-20 MG Oral Cap       ergocalciferol 1.25 MG (26608 UT) Oral Cap Take 1 capsule (50,000 Units total) by mouth once a week. 12 capsule 0    Clopidogrel Bisulfate 75 MG Oral Tab Take 1 tablet  (75 mg total) by mouth daily. 30 tablet 0     Allergies:  Allergies   Allergen Reactions    Dust Mite Extract Coughing       Objective:   Physical Exam  Vitals and nursing note reviewed.   Constitutional:       Appearance: He is well-developed.   HENT:      Head: Normocephalic and atraumatic.      Right Ear: External ear normal.      Left Ear: External ear normal.      Nose: Nose normal.   Eyes:      Conjunctiva/sclera: Conjunctivae normal.      Pupils: Pupils are equal, round, and reactive to light.   Cardiovascular:      Rate and Rhythm: Normal rate and regular rhythm.      Heart sounds: Normal heart sounds.   Pulmonary:      Effort: Pulmonary effort is normal.      Breath sounds: Normal breath sounds.   Abdominal:      General: Bowel sounds are normal.      Palpations: Abdomen is soft.   Genitourinary:     Penis: Normal.       Prostate: Normal.      Rectum: Normal.   Musculoskeletal:         General: Normal range of motion.      Cervical back: Normal range of motion and neck supple.   Skin:     General: Skin is warm and dry.   Neurological:      Mental Status: He is alert and oriented to person, place, and time.      Deep Tendon Reflexes: Reflexes are normal and symmetric.         Assessment & Plan:   1. Pre-op evaluation will order EKG will follow the EKG results and will clear patient for the procedure should continue all his meds   EKG also is okay with no new changes patient cleared for dental procedure    No orders of the defined types were placed in this encounter.      Meds This Visit:  Requested Prescriptions      No prescriptions requested or ordered in this encounter       Imaging & Referrals:  EKG 12-LEAD

## 2024-04-19 ENCOUNTER — TELEPHONE (OUTPATIENT)
Dept: INTERNAL MEDICINE CLINIC | Facility: CLINIC | Age: 64
End: 2024-04-19

## 2024-04-19 NOTE — TELEPHONE ENCOUNTER
Olesya from [facility] calling to request a pre-op clearance letter and EKG results from Dr. Marie.    Patient's dental surgery is 5/3 and the office will need the clearance letter next week Wednesday 4/24.    Dr. Dobbs fax [phone]

## 2024-04-19 NOTE — TELEPHONE ENCOUNTER
Pt was seen yesterday, 4/18 at 6:45pm.   EKG was ordered, informed us he would have it done over the wkend.

## 2024-04-20 ENCOUNTER — LAB ENCOUNTER (OUTPATIENT)
Dept: LAB | Facility: HOSPITAL | Age: 64
End: 2024-04-20
Attending: INTERNAL MEDICINE
Payer: COMMERCIAL

## 2024-04-20 DIAGNOSIS — Z01.818 PRE-OP EVALUATION: ICD-10-CM

## 2024-04-20 LAB
ATRIAL RATE: 72 BPM
P AXIS: 37 DEGREES
P-R INTERVAL: 230 MS
Q-T INTERVAL: 364 MS
QRS DURATION: 112 MS
QTC CALCULATION (BEZET): 398 MS
R AXIS: -52 DEGREES
T AXIS: -11 DEGREES
VENTRICULAR RATE: 72 BPM

## 2024-04-20 PROCEDURE — 93005 ELECTROCARDIOGRAM TRACING: CPT

## 2024-04-20 PROCEDURE — 93010 ELECTROCARDIOGRAM REPORT: CPT | Performed by: INTERNAL MEDICINE

## 2024-04-22 ENCOUNTER — TELEPHONE (OUTPATIENT)
Dept: INTERNAL MEDICINE CLINIC | Facility: CLINIC | Age: 64
End: 2024-04-22

## 2024-04-22 NOTE — TELEPHONE ENCOUNTER
Patient is requesting a call back with clarification as to when he should stop taking the blood thinners.   Patient is scheduled for dental procedure on 5/3/2024.   Please advise.

## 2024-04-22 NOTE — TELEPHONE ENCOUNTER
Informed patient regarding DR Marie's recommendation regarding the blood thinners and stated understanding.     Patient is requesting a LETTER about the blood thinners and fax to his dentis's office .      COPIED AND PASTE  4/19/24 pre op encounter  for the dentist's information.   Patient's dental surgery is 5/3 and the office will need the clearance letter next week Wednesday 4/24.     Dr. Dobbs fax [phone]

## 2024-05-01 ENCOUNTER — TELEPHONE (OUTPATIENT)
Dept: NEUROLOGY | Facility: CLINIC | Age: 64
End: 2024-05-01

## 2024-05-01 NOTE — TELEPHONE ENCOUNTER
LOV 02/16/23  Due in August 2024.      Phone call returned to pt wife, aKylyn.  Kaylyn states that the diagnosis that the pt was given, pt wife was told to monitor for pt changes. Pt is still pretty lucid, but the last couple of months, pt has had angry outburst (occasional) usually when he is not remembering things that the wife has said to him and then will be normal again. She states that he is very out of character, but it happens here and there. Pt still goes to work and has no issues there. Pt is in denial about his actions. RN scheduled appointment for pt.

## 2024-05-01 NOTE — TELEPHONE ENCOUNTER
Pt wife called in advised she wants to speak to dr eldridge or clinical team. Did advise dr eldridge is seeing pts today. But advised if clinical team can give her a call back to discuss husbands diagnosis. Pt wife did not want to go into any further detail. Pls advise.

## 2024-05-02 ENCOUNTER — OFFICE VISIT (OUTPATIENT)
Dept: INTERNAL MEDICINE CLINIC | Facility: CLINIC | Age: 64
End: 2024-05-02

## 2024-05-02 VITALS
DIASTOLIC BLOOD PRESSURE: 78 MMHG | BODY MASS INDEX: 41.22 KG/M2 | WEIGHT: 311 LBS | OXYGEN SATURATION: 98 % | RESPIRATION RATE: 18 BRPM | SYSTOLIC BLOOD PRESSURE: 128 MMHG | HEIGHT: 73 IN | HEART RATE: 67 BPM | TEMPERATURE: 98 F

## 2024-05-02 DIAGNOSIS — E11.9 TYPE 2 DIABETES MELLITUS WITHOUT COMPLICATION, WITHOUT LONG-TERM CURRENT USE OF INSULIN (HCC): ICD-10-CM

## 2024-05-02 DIAGNOSIS — I25.118 CORONARY ARTERY DISEASE OF NATIVE ARTERY OF NATIVE HEART WITH STABLE ANGINA PECTORIS (HCC): ICD-10-CM

## 2024-05-02 DIAGNOSIS — I10 PRIMARY HYPERTENSION: ICD-10-CM

## 2024-05-02 DIAGNOSIS — Z00.00 ANNUAL PHYSICAL EXAM: Primary | ICD-10-CM

## 2024-05-02 DIAGNOSIS — Z00.00 ENCOUNTER FOR PREVENTIVE CARE: ICD-10-CM

## 2024-05-02 DIAGNOSIS — E55.9 VITAMIN D DEFICIENCY: ICD-10-CM

## 2024-05-02 DIAGNOSIS — I65.1 BASILAR ARTERY STENOSIS: ICD-10-CM

## 2024-05-02 DIAGNOSIS — Z86.73 HISTORY OF CVA (CEREBROVASCULAR ACCIDENT): ICD-10-CM

## 2024-05-02 DIAGNOSIS — E11.9 COMPREHENSIVE DIABETIC FOOT EXAMINATION, TYPE 2 DM, ENCOUNTER FOR (HCC): ICD-10-CM

## 2024-05-02 PROCEDURE — 3074F SYST BP LT 130 MM HG: CPT | Performed by: INTERNAL MEDICINE

## 2024-05-02 PROCEDURE — 3078F DIAST BP <80 MM HG: CPT | Performed by: INTERNAL MEDICINE

## 2024-05-02 PROCEDURE — 3008F BODY MASS INDEX DOCD: CPT | Performed by: INTERNAL MEDICINE

## 2024-05-02 PROCEDURE — 99397 PER PM REEVAL EST PAT 65+ YR: CPT | Performed by: INTERNAL MEDICINE

## 2024-05-06 ENCOUNTER — TELEPHONE (OUTPATIENT)
Dept: NEUROLOGY | Facility: CLINIC | Age: 64
End: 2024-05-06

## 2024-05-06 NOTE — TELEPHONE ENCOUNTER
Phone call received from pt. Pt states that we called him. RN advised that this office did not call him.

## 2024-05-06 NOTE — TELEPHONE ENCOUNTER
Pts wife called the answering service asking to speak with the clinical team. There was no further information in the message from the answering service

## 2024-05-09 NOTE — PROGRESS NOTES
Subjective:     Patient ID: Juan Walker is a 63 year old male.    HPI    Patient comes in for annual physical denies any new complaints taking his meds  History/Other:   Review of Systems   Constitutional: Negative.  Negative for fatigue and fever.   HENT: Negative.  Negative for congestion.    Eyes: Negative.    Respiratory: Negative.  Negative for cough, shortness of breath and wheezing.    Cardiovascular: Negative.  Negative for chest pain, palpitations and leg swelling.   Gastrointestinal: Negative.    Endocrine: Negative for cold intolerance and heat intolerance.   Genitourinary: Negative.  Negative for dysuria, flank pain and hematuria.   Musculoskeletal: Negative.  Negative for arthralgias, back pain and myalgias.   Skin: Negative.    Neurological: Negative.  Negative for dizziness, tremors, syncope, weakness and headaches.   Psychiatric/Behavioral: Negative.  Negative for agitation, behavioral problems and suicidal ideas. The patient is not nervous/anxious.      Current Outpatient Medications   Medication Sig Dispense Refill    glipiZIDE ER 2.5 MG Oral Tablet 24 Hr Take 1 tablet (2.5 mg total) by mouth daily with breakfast. 90 tablet 3    docusate sodium 100 MG Oral Cap Take 1 capsule (100 mg total) by mouth 2 (two) times daily as needed. 60 capsule 1    Sildenafil Citrate (VIAGRA) 100 MG Oral Tab Take 1 tablet (100 mg total) by mouth as needed for Erectile Dysfunction. 9 tablet 2    metFORMIN 500 MG Oral Tab Take 1 tablet (500 mg total) by mouth 2 (two) times daily with meals. 180 tablet 3    ASPIRIN LOW DOSE 81 MG Oral Tab EC TAKE 1 TABLET BY MOUTH EVERY DAY 90 tablet 3    baclofen 5 MG Oral Tab Take 1 tablet (5 mg total) by mouth nightly as needed. 30 tablet 0    atorvastatin 80 MG Oral Tab Take 1 tablet (80 mg total) by mouth nightly. 90 tablet 1    metoprolol succinate  MG Oral Tablet 24 Hr Take 1 tablet (100 mg total) by mouth Daily Beta Blocker. 90 tablet 1    amLODIPine Besy-Benazepril HCl  5-20 MG Oral Cap       ergocalciferol 1.25 MG (19627 UT) Oral Cap Take 1 capsule (50,000 Units total) by mouth once a week. 12 capsule 0    Clopidogrel Bisulfate 75 MG Oral Tab Take 1 tablet (75 mg total) by mouth daily. 30 tablet 0     Allergies:  Allergies   Allergen Reactions    Dust Mite Extract Coughing       Past Medical History:    History of heart artery stent    Stroke (HCC)    June 2021      No past surgical history on file.   No family history on file.   Social History:   Social History     Socioeconomic History    Marital status:    Tobacco Use    Smoking status: Never    Smokeless tobacco: Never   Substance and Sexual Activity    Alcohol use: Not Currently    Drug use: Never   Other Topics Concern    Caffeine Concern Yes     Comment: coffee    Exercise Yes     Comment: walking     Social Determinants of Health      Received from [facility], [facility]    Social Connections    Received from [facility]        Objective:   Physical Exam  Vitals and nursing note reviewed.   Constitutional:       Appearance: He is well-developed.   HENT:      Head: Normocephalic and atraumatic.      Right Ear: External ear normal.      Left Ear: External ear normal.      Nose: Nose normal.   Eyes:      Conjunctiva/sclera: Conjunctivae normal.      Pupils: Pupils are equal, round, and reactive to light.   Cardiovascular:      Rate and Rhythm: Normal rate and regular rhythm.      Heart sounds: Normal heart sounds.   Pulmonary:      Effort: Pulmonary effort is normal.      Breath sounds: Normal breath sounds.   Abdominal:      General: Bowel sounds are normal.      Palpations: Abdomen is soft.   Genitourinary:     Penis: Normal.       Prostate: Normal.      Rectum: Normal.   Musculoskeletal:         General: Normal range of motion.      Cervical back: Normal range of motion and neck supple.      Comments: Bilateral barefoot skin diabetic exam is normal, visualized  feet and the appearance is normal.  Bilateral monofilament/sensation of both feet is normal.  Pulsation pedal pulse exam of both lower legs/feet is normal as well.         Skin:     General: Skin is warm and dry.   Neurological:      Mental Status: He is alert and oriented to person, place, and time.      Deep Tendon Reflexes: Reflexes are normal and symmetric.         Assessment & Plan:   1. Annual physical exam exam is okay will order labs   2. Comprehensive diabetic foot examination, type 2 DM, encounter for ([facility]) Bilateral barefoot skin diabetic exam is normal, visualized feet and the appearance is normal.  Bilateral monofilament/sensation of both feet is normal.  Pulsation pedal pulse exam of both lower legs/feet is normal as well.       3. Type 2 diabetes mellitus without complication, without long-term current use of insulin ([facility]) we will retest with statin medication   4. Primary hypertension continue current treatment watch diet take medication   5. History of CVA (cerebrovascular accident) stable medical management medical management follows with heart    6. Coronary artery disease of native artery of native heart with stable angina pectoris ([facility])    7. Basilar artery stenosis medical management   8. Vitamin D deficiency we will retest   9. Encounter for preventive care referred to GI       Orders Placed This Encounter   Procedures    Vitamin D [E]    CBC With Differential With Platelet    Comp Metabolic Panel (14)    Lipid Panel    TSH W Reflex To Free T4    Urinalysis, Routine    PSA Total, Screen    Hemoglobin A1C    Microalb/Creat Ratio, Random Urine       Meds This Visit:  Requested Prescriptions      No prescriptions requested or ordered in this encounter       Imaging & Referrals:  GASTRO - INTERNAL  OPHTHALMOLOGY - INTERNAL

## 2024-08-07 ENCOUNTER — TELEPHONE (OUTPATIENT)
Dept: INTERNAL MEDICINE CLINIC | Facility: CLINIC | Age: 64
End: 2024-08-07

## 2024-08-08 ENCOUNTER — OFFICE VISIT (OUTPATIENT)
Dept: INTERNAL MEDICINE CLINIC | Facility: CLINIC | Age: 64
End: 2024-08-08

## 2024-08-08 VITALS
HEART RATE: 89 BPM | RESPIRATION RATE: 17 BRPM | OXYGEN SATURATION: 98 % | DIASTOLIC BLOOD PRESSURE: 74 MMHG | WEIGHT: 309 LBS | SYSTOLIC BLOOD PRESSURE: 126 MMHG | BODY MASS INDEX: 40.95 KG/M2 | TEMPERATURE: 98 F | HEIGHT: 73 IN

## 2024-08-08 DIAGNOSIS — K59.00 CONSTIPATION, UNSPECIFIED CONSTIPATION TYPE: ICD-10-CM

## 2024-08-08 DIAGNOSIS — E11.9 TYPE 2 DIABETES MELLITUS WITHOUT COMPLICATION, WITHOUT LONG-TERM CURRENT USE OF INSULIN (HCC): ICD-10-CM

## 2024-08-08 DIAGNOSIS — I10 PRIMARY HYPERTENSION: ICD-10-CM

## 2024-08-08 DIAGNOSIS — I25.118 CORONARY ARTERY DISEASE OF NATIVE ARTERY OF NATIVE HEART WITH STABLE ANGINA PECTORIS (HCC): Primary | ICD-10-CM

## 2024-08-08 DIAGNOSIS — Z86.73 HISTORY OF CVA (CEREBROVASCULAR ACCIDENT): ICD-10-CM

## 2024-08-08 PROCEDURE — 3078F DIAST BP <80 MM HG: CPT | Performed by: INTERNAL MEDICINE

## 2024-08-08 PROCEDURE — 90677 PCV20 VACCINE IM: CPT | Performed by: INTERNAL MEDICINE

## 2024-08-08 PROCEDURE — 99214 OFFICE O/P EST MOD 30 MIN: CPT | Performed by: INTERNAL MEDICINE

## 2024-08-08 PROCEDURE — 90471 IMMUNIZATION ADMIN: CPT | Performed by: INTERNAL MEDICINE

## 2024-08-08 PROCEDURE — 3074F SYST BP LT 130 MM HG: CPT | Performed by: INTERNAL MEDICINE

## 2024-08-08 PROCEDURE — 3008F BODY MASS INDEX DOCD: CPT | Performed by: INTERNAL MEDICINE

## 2024-08-08 RX ORDER — DOCUSATE SODIUM 100 MG/1
100 CAPSULE, LIQUID FILLED ORAL 2 TIMES DAILY PRN
Qty: 60 CAPSULE | Refills: 1 | Status: SHIPPED | OUTPATIENT
Start: 2024-08-08

## 2024-08-08 NOTE — TELEPHONE ENCOUNTER
Patient called, verified name/.  Informed him he has appointment today at [place] [address] at 6:30.  Was supposed to get blood tests done.  Informed him he needs to get eye exam, our ophthalmologist are retiring, needs to find alternate provider.  He asked about scheduling a colonoscopy.  Advised to call GI office [phone].  He stated understanding.    Future Appointments   Date Time Provider Department Center   2024  6:30 PM Christian Marie MD [id number] [place] [id number]   10/28/2024  9:00 AM R[facility] Connell DO [id number]   2024  3:15 PM Jerry Cuello MD [id number] [facility]

## 2024-08-09 NOTE — PROGRESS NOTES
Subjective:     Patient ID: Juan Walker is a 64 year old male.    HPI  Pt comes in for follow up, over all doing ok no complaints today he is scheduled to see eye md in few months also scheduled  colonoscopy soon.  He has not done the labs yet he will go this weekend to get it done takes meds no side effects  History/Other:   Review of Systems   Constitutional: Negative.    HENT: Negative.     Eyes: Negative.    Respiratory: Negative.     Cardiovascular: Negative.    Gastrointestinal: Negative.    Genitourinary: Negative.    Musculoskeletal: Negative.    Skin: Negative.    Neurological: Negative.    Psychiatric/Behavioral: Negative.       Current Outpatient Medications   Medication Sig Dispense Refill    docusate sodium 100 MG Oral Cap Take 1 capsule (100 mg total) by mouth 2 (two) times daily as needed. 60 capsule 1    glipiZIDE ER 2.5 MG Oral Tablet 24 Hr Take 1 tablet (2.5 mg total) by mouth daily with breakfast. 90 tablet 3    Sildenafil Citrate (VIAGRA) 100 MG Oral Tab Take 1 tablet (100 mg total) by mouth as needed for Erectile Dysfunction. 9 tablet 2    metFORMIN 500 MG Oral Tab Take 1 tablet (500 mg total) by mouth 2 (two) times daily with meals. 180 tablet 3    ASPIRIN LOW DOSE 81 MG Oral Tab EC TAKE 1 TABLET BY MOUTH EVERY DAY 90 tablet 3    baclofen 5 MG Oral Tab Take 1 tablet (5 mg total) by mouth nightly as needed. 30 tablet 0    atorvastatin 80 MG Oral Tab Take 1 tablet (80 mg total) by mouth nightly. 90 tablet 1    metoprolol succinate  MG Oral Tablet 24 Hr Take 1 tablet (100 mg total) by mouth Daily Beta Blocker. 90 tablet 1    amLODIPine Besy-Benazepril HCl 5-20 MG Oral Cap       ergocalciferol 1.25 MG (05886 UT) Oral Cap Take 1 capsule (50,000 Units total) by mouth once a week. 12 capsule 0    Clopidogrel Bisulfate 75 MG Oral Tab Take 1 tablet (75 mg total) by mouth daily. 30 tablet 0     Allergies:  Allergies   Allergen Reactions    Dust Mite Extract Coughing       Past Medical History:     History of heart artery stent    Stroke (HCC)    June 2021      No past surgical history on file.   No family history on file.   Social History:   Social History     Socioeconomic History    Marital status:    Tobacco Use    Smoking status: Never    Smokeless tobacco: Never   Substance and Sexual Activity    Alcohol use: Not Currently    Drug use: Never   Other Topics Concern    Caffeine Concern Yes     Comment: coffee    Exercise Yes     Comment: walking     Social Determinants of Health      Received from [facility], [facility]    Social Connections    Received from [facility]        Objective:   Physical Exam  Vitals and nursing note reviewed.   Constitutional:       Appearance: He is well-developed.   HENT:      Head: Normocephalic and atraumatic.      Right Ear: External ear normal.      Left Ear: External ear normal.      Nose: Nose normal.   Eyes:      Conjunctiva/sclera: Conjunctivae normal.      Pupils: Pupils are equal, round, and reactive to light.   Cardiovascular:      Rate and Rhythm: Normal rate and regular rhythm.      Heart sounds: Normal heart sounds.   Pulmonary:      Effort: Pulmonary effort is normal.      Breath sounds: Normal breath sounds.   Abdominal:      General: Bowel sounds are normal.      Palpations: Abdomen is soft.   Genitourinary:     Penis: Normal.       Prostate: Normal.      Rectum: Normal.   Musculoskeletal:         General: Normal range of motion.      Cervical back: Normal range of motion and neck supple.   Skin:     General: Skin is warm and dry.   Neurological:      Mental Status: He is alert and oriented to person, place, and time.      Deep Tendon Reflexes: Reflexes are normal and symmetric.         Assessment & Plan:   1. Coronary artery disease of native artery of native heart with stable angina pectoris (HCC) - med management, follows with cards    2. Type 2 diabetes mellitus without complication,  without long-term current use of insulin (HCC) - will follow labs, continue with current care    3. Primary hypertension - well controlled    4. Constipation, unspecified constipation type - drink more fluids eat more fiber as need medication    5. History of CVA (cerebrovascular accident) - stable medical management        Orders Placed This Encounter   Procedures    Prevnar 20 (PCV20) [20903]       Meds This Visit:  Requested Prescriptions     Signed Prescriptions Disp Refills    docusate sodium 100 MG Oral Cap 60 capsule 1     Sig: Take 1 capsule (100 mg total) by mouth 2 (two) times daily as needed.       Imaging & Referrals:  PCV20 VACCINE FOR INTRAMUSCULAR USE

## 2024-08-13 ENCOUNTER — NURSE ONLY (OUTPATIENT)
Facility: CLINIC | Age: 64
End: 2024-08-13

## 2024-08-13 NOTE — PROGRESS NOTES
Chart reviewed-      Required GI telephone colon screening questionnaires not completed prior to scheduled appointment.      CCS: If patient calls GI please notify him/her reasoning for cancellation. If appropriate, please reschedule today's telephone colon screening appointment.     Thank you!

## 2024-09-07 ENCOUNTER — LAB ENCOUNTER (OUTPATIENT)
Dept: LAB | Facility: HOSPITAL | Age: 64
End: 2024-09-07
Attending: INTERNAL MEDICINE
Payer: COMMERCIAL

## 2024-09-07 DIAGNOSIS — I65.1 BASILAR ARTERY STENOSIS: ICD-10-CM

## 2024-09-07 DIAGNOSIS — E55.9 VITAMIN D DEFICIENCY: ICD-10-CM

## 2024-09-07 DIAGNOSIS — Z86.73 HISTORY OF CVA (CEREBROVASCULAR ACCIDENT): ICD-10-CM

## 2024-09-07 DIAGNOSIS — Z00.00 ANNUAL PHYSICAL EXAM: ICD-10-CM

## 2024-09-07 DIAGNOSIS — I25.118 CORONARY ARTERY DISEASE OF NATIVE ARTERY OF NATIVE HEART WITH STABLE ANGINA PECTORIS (HCC): ICD-10-CM

## 2024-09-07 DIAGNOSIS — I10 PRIMARY HYPERTENSION: ICD-10-CM

## 2024-09-07 DIAGNOSIS — E11.9 TYPE 2 DIABETES MELLITUS WITHOUT COMPLICATION, WITHOUT LONG-TERM CURRENT USE OF INSULIN (HCC): ICD-10-CM

## 2024-09-07 DIAGNOSIS — Z00.00 ENCOUNTER FOR PREVENTIVE CARE: ICD-10-CM

## 2024-09-07 DIAGNOSIS — E11.9 COMPREHENSIVE DIABETIC FOOT EXAMINATION, TYPE 2 DM, ENCOUNTER FOR (HCC): ICD-10-CM

## 2024-09-07 LAB
ALBUMIN SERPL-MCNC: 4.3 G/DL (ref 3.2–4.8)
ALBUMIN/GLOB SERPL: 1.3 {RATIO} (ref 1–2)
ALP LIVER SERPL-CCNC: 95 U/L
ALT SERPL-CCNC: 27 U/L
ANION GAP SERPL CALC-SCNC: 6 MMOL/L (ref 0–18)
AST SERPL-CCNC: 20 U/L (ref ?–34)
BASOPHILS # BLD AUTO: 0.02 X10(3) UL (ref 0–0.2)
BASOPHILS NFR BLD AUTO: 0.4 %
BILIRUB SERPL-MCNC: 0.5 MG/DL (ref 0.2–1.1)
BILIRUB UR QL: NEGATIVE
BUN BLD-MCNC: 10 MG/DL (ref 9–23)
BUN/CREAT SERPL: 9.1 (ref 10–20)
CALCIUM BLD-MCNC: 9.4 MG/DL (ref 8.7–10.4)
CHLORIDE SERPL-SCNC: 110 MMOL/L (ref 98–112)
CHOLEST SERPL-MCNC: 132 MG/DL (ref ?–200)
CLARITY UR: CLEAR
CO2 SERPL-SCNC: 26 MMOL/L (ref 21–32)
COMPLEXED PSA SERPL-MCNC: 1.45 NG/ML (ref ?–4)
CREAT BLD-MCNC: 1.1 MG/DL
CREAT UR-SCNC: 167.8 MG/DL
DEPRECATED RDW RBC AUTO: 44.4 FL (ref 35.1–46.3)
EGFRCR SERPLBLD CKD-EPI 2021: 74 ML/MIN/1.73M2 (ref 60–?)
EOSINOPHIL # BLD AUTO: 0.36 X10(3) UL (ref 0–0.7)
EOSINOPHIL NFR BLD AUTO: 7.5 %
ERYTHROCYTE [DISTWIDTH] IN BLOOD BY AUTOMATED COUNT: 12.5 % (ref 11–15)
EST. AVERAGE GLUCOSE BLD GHB EST-MCNC: 140 MG/DL (ref 68–126)
FASTING PATIENT LIPID ANSWER: YES
FASTING STATUS PATIENT QL REPORTED: YES
GLOBULIN PLAS-MCNC: 3.4 G/DL (ref 2–3.5)
GLUCOSE BLD-MCNC: 108 MG/DL (ref 70–99)
GLUCOSE UR-MCNC: NORMAL MG/DL
HBA1C MFR BLD: 6.5 % (ref ?–5.7)
HCT VFR BLD AUTO: 43.4 %
HDLC SERPL-MCNC: 40 MG/DL (ref 40–59)
HGB BLD-MCNC: 14.5 G/DL
HGB UR QL STRIP.AUTO: NEGATIVE
IMM GRANULOCYTES # BLD AUTO: 0.01 X10(3) UL (ref 0–1)
IMM GRANULOCYTES NFR BLD: 0.2 %
KETONES UR-MCNC: NEGATIVE MG/DL
LDLC SERPL CALC-MCNC: 72 MG/DL (ref ?–100)
LEUKOCYTE ESTERASE UR QL STRIP.AUTO: NEGATIVE
LYMPHOCYTES # BLD AUTO: 1.95 X10(3) UL (ref 1–4)
LYMPHOCYTES NFR BLD AUTO: 40.8 %
MCH RBC QN AUTO: 32.4 PG (ref 26–34)
MCHC RBC AUTO-ENTMCNC: 33.4 G/DL (ref 31–37)
MCV RBC AUTO: 96.9 FL
MICROALBUMIN UR-MCNC: 0.8 MG/DL
MICROALBUMIN/CREAT 24H UR-RTO: 4.8 UG/MG (ref ?–30)
MONOCYTES # BLD AUTO: 0.35 X10(3) UL (ref 0.1–1)
MONOCYTES NFR BLD AUTO: 7.3 %
NEUTROPHILS # BLD AUTO: 2.09 X10 (3) UL (ref 1.5–7.7)
NEUTROPHILS # BLD AUTO: 2.09 X10(3) UL (ref 1.5–7.7)
NEUTROPHILS NFR BLD AUTO: 43.8 %
NITRITE UR QL STRIP.AUTO: NEGATIVE
NONHDLC SERPL-MCNC: 92 MG/DL (ref ?–130)
OSMOLALITY SERPL CALC.SUM OF ELEC: 294 MOSM/KG (ref 275–295)
PH UR: 5.5 [PH] (ref 5–8)
PLATELET # BLD AUTO: 228 10(3)UL (ref 150–450)
POTASSIUM SERPL-SCNC: 4.5 MMOL/L (ref 3.5–5.1)
PROT SERPL-MCNC: 7.7 G/DL (ref 5.7–8.2)
PROT UR-MCNC: NEGATIVE MG/DL
RBC # BLD AUTO: 4.48 X10(6)UL
SODIUM SERPL-SCNC: 142 MMOL/L (ref 136–145)
SP GR UR STRIP: 1.02 (ref 1–1.03)
TRIGL SERPL-MCNC: 107 MG/DL (ref 30–149)
TSI SER-ACNC: 0.68 MIU/ML (ref 0.55–4.78)
UROBILINOGEN UR STRIP-ACNC: NORMAL
VIT D+METAB SERPL-MCNC: 27.2 NG/ML (ref 30–100)
VLDLC SERPL CALC-MCNC: 16 MG/DL (ref 0–30)
WBC # BLD AUTO: 4.8 X10(3) UL (ref 4–11)

## 2024-09-07 PROCEDURE — 84443 ASSAY THYROID STIM HORMONE: CPT

## 2024-09-07 PROCEDURE — 82043 UR ALBUMIN QUANTITATIVE: CPT

## 2024-09-07 PROCEDURE — 82306 VITAMIN D 25 HYDROXY: CPT

## 2024-09-07 PROCEDURE — 83036 HEMOGLOBIN GLYCOSYLATED A1C: CPT

## 2024-09-07 PROCEDURE — 81003 URINALYSIS AUTO W/O SCOPE: CPT

## 2024-09-07 PROCEDURE — 80053 COMPREHEN METABOLIC PANEL: CPT

## 2024-09-07 PROCEDURE — 82570 ASSAY OF URINE CREATININE: CPT

## 2024-09-07 PROCEDURE — 80061 LIPID PANEL: CPT

## 2024-09-07 PROCEDURE — 85025 COMPLETE CBC W/AUTO DIFF WBC: CPT

## 2024-09-07 PROCEDURE — 36415 COLL VENOUS BLD VENIPUNCTURE: CPT

## 2024-11-24 ENCOUNTER — TELEPHONE (OUTPATIENT)
Dept: INTERNAL MEDICINE CLINIC | Facility: CLINIC | Age: 64
End: 2024-11-24

## 2024-12-06 ENCOUNTER — MED REC SCAN ONLY (OUTPATIENT)
Dept: INTERNAL MEDICINE CLINIC | Facility: CLINIC | Age: 64
End: 2024-12-06

## 2024-12-06 ENCOUNTER — TELEPHONE (OUTPATIENT)
Dept: INTERNAL MEDICINE CLINIC | Facility: CLINIC | Age: 64
End: 2024-12-06

## 2024-12-06 DIAGNOSIS — D72.819 LEUKOPENIA, UNSPECIFIED TYPE: Primary | ICD-10-CM

## 2024-12-06 DIAGNOSIS — R79.89 ABNORMAL SERUM THYROXINE (T4) LEVEL: ICD-10-CM

## 2024-12-06 DIAGNOSIS — R79.89 HIGH SERUM TRIIODOTHYRONINE (T3): ICD-10-CM

## 2024-12-06 NOTE — TELEPHONE ENCOUNTER
Called patient, no answer. Left VM to call us back to discuss results.  Also left message on [employer].

## 2024-12-09 NOTE — TELEPHONE ENCOUNTER
Christian Marie MD to Nicole Wise, WILBER         12/6/24  3:41 PM  Patient had some labs done at his cardiologist and CBC and thyroid function came back abnormal I would like to repeat this please get it done next week sometime will placed order and I will decide if any further testing    Patient notified, verbalized understanding. Patient also scheduled follow up visit     Future Appointments   Date Time Provider Department Center   12/14/2024  9:15 AM Christian Marie MD [id number]  [place] [address]

## 2024-12-14 ENCOUNTER — OFFICE VISIT (OUTPATIENT)
Dept: INTERNAL MEDICINE CLINIC | Facility: CLINIC | Age: 64
End: 2024-12-14

## 2024-12-14 VITALS
HEIGHT: 73 IN | HEART RATE: 78 BPM | OXYGEN SATURATION: 98 % | TEMPERATURE: 98 F | WEIGHT: 305 LBS | RESPIRATION RATE: 18 BRPM | BODY MASS INDEX: 40.42 KG/M2 | DIASTOLIC BLOOD PRESSURE: 74 MMHG | SYSTOLIC BLOOD PRESSURE: 128 MMHG

## 2024-12-14 DIAGNOSIS — R79.89 HIGH SERUM TRIIODOTHYRONINE (T3): ICD-10-CM

## 2024-12-14 DIAGNOSIS — R79.89 ABNORMAL SERUM THYROXINE (T4) LEVEL: ICD-10-CM

## 2024-12-14 DIAGNOSIS — D72.819 LEUKOPENIA, UNSPECIFIED TYPE: ICD-10-CM

## 2024-12-14 DIAGNOSIS — I25.118 CORONARY ARTERY DISEASE OF NATIVE ARTERY OF NATIVE HEART WITH STABLE ANGINA PECTORIS (HCC): ICD-10-CM

## 2024-12-14 DIAGNOSIS — Z28.21 FLU VACCINE REFUSED: ICD-10-CM

## 2024-12-14 DIAGNOSIS — I10 PRIMARY HYPERTENSION: ICD-10-CM

## 2024-12-14 DIAGNOSIS — E11.9 TYPE 2 DIABETES MELLITUS WITHOUT COMPLICATION, WITHOUT LONG-TERM CURRENT USE OF INSULIN (HCC): ICD-10-CM

## 2024-12-14 DIAGNOSIS — Z00.00 ENCOUNTER FOR PREVENTIVE CARE: ICD-10-CM

## 2024-12-14 DIAGNOSIS — R94.6 ABNORMAL THYROID FUNCTION TEST: Primary | ICD-10-CM

## 2024-12-14 LAB
BASOPHILS # BLD AUTO: 0.02 X10(3) UL (ref 0–0.2)
BASOPHILS NFR BLD AUTO: 0.6 %
DEPRECATED RDW RBC AUTO: 44 FL (ref 35.1–46.3)
EOSINOPHIL # BLD AUTO: 0.06 X10(3) UL (ref 0–0.7)
EOSINOPHIL NFR BLD AUTO: 1.9 %
ERYTHROCYTE [DISTWIDTH] IN BLOOD BY AUTOMATED COUNT: 12.2 % (ref 11–15)
HCT VFR BLD AUTO: 41.3 %
HGB BLD-MCNC: 14.1 G/DL
IMM GRANULOCYTES # BLD AUTO: 0.01 X10(3) UL (ref 0–1)
IMM GRANULOCYTES NFR BLD: 0.3 %
LYMPHOCYTES # BLD AUTO: 1.52 X10(3) UL (ref 1–4)
LYMPHOCYTES NFR BLD AUTO: 49.2 %
MCH RBC QN AUTO: 33.6 PG (ref 26–34)
MCHC RBC AUTO-ENTMCNC: 34.1 G/DL (ref 31–37)
MCV RBC AUTO: 98.3 FL
MONOCYTES # BLD AUTO: 0.27 X10(3) UL (ref 0.1–1)
MONOCYTES NFR BLD AUTO: 8.7 %
NEUTROPHILS # BLD AUTO: 1.21 X10 (3) UL (ref 1.5–7.7)
NEUTROPHILS # BLD AUTO: 1.21 X10(3) UL (ref 1.5–7.7)
NEUTROPHILS NFR BLD AUTO: 39.3 %
PLATELET # BLD AUTO: 190 10(3)UL (ref 150–450)
RBC # BLD AUTO: 4.2 X10(6)UL
T3FREE SERPL-MCNC: 4.16 PG/ML (ref 2.4–4.2)
T4 FREE SERPL-MCNC: 1.2 NG/DL (ref 0.8–1.7)
TSI SER-ACNC: 0.45 UIU/ML (ref 0.55–4.78)
WBC # BLD AUTO: 3.1 X10(3) UL (ref 4–11)

## 2024-12-14 PROCEDURE — 3061F NEG MICROALBUMINURIA REV: CPT | Performed by: INTERNAL MEDICINE

## 2024-12-14 PROCEDURE — 99214 OFFICE O/P EST MOD 30 MIN: CPT | Performed by: INTERNAL MEDICINE

## 2024-12-14 PROCEDURE — 3074F SYST BP LT 130 MM HG: CPT | Performed by: INTERNAL MEDICINE

## 2024-12-14 PROCEDURE — 3078F DIAST BP <80 MM HG: CPT | Performed by: INTERNAL MEDICINE

## 2024-12-14 PROCEDURE — 36415 COLL VENOUS BLD VENIPUNCTURE: CPT | Performed by: INTERNAL MEDICINE

## 2024-12-14 PROCEDURE — 3044F HG A1C LEVEL LT 7.0%: CPT | Performed by: INTERNAL MEDICINE

## 2024-12-14 PROCEDURE — 3008F BODY MASS INDEX DOCD: CPT | Performed by: INTERNAL MEDICINE

## 2024-12-14 RX ORDER — METOPROLOL SUCCINATE 100 MG/1
100 TABLET, EXTENDED RELEASE ORAL
Qty: 90 TABLET | Refills: 1 | Status: SHIPPED | OUTPATIENT
Start: 2024-12-14

## 2024-12-14 RX ORDER — BACLOFEN 5 MG/1
5 TABLET ORAL NIGHTLY PRN
Qty: 30 TABLET | Refills: 0 | Status: SHIPPED | OUTPATIENT
Start: 2024-12-14

## 2024-12-14 RX ORDER — GLIPIZIDE 2.5 MG/1
2.5 TABLET, EXTENDED RELEASE ORAL
Qty: 90 TABLET | Refills: 3 | Status: SHIPPED | OUTPATIENT
Start: 2024-12-14

## 2024-12-14 RX ORDER — AMLODIPINE AND BENAZEPRIL HYDROCHLORIDE 5; 20 MG/1; MG/1
1 CAPSULE ORAL DAILY
Qty: 90 CAPSULE | Refills: 3 | Status: SHIPPED | OUTPATIENT
Start: 2024-12-14

## 2024-12-14 RX ORDER — ATORVASTATIN CALCIUM 80 MG/1
80 TABLET, FILM COATED ORAL NIGHTLY
Qty: 90 TABLET | Refills: 1 | Status: SHIPPED | OUTPATIENT
Start: 2024-12-14

## 2024-12-14 RX ORDER — CLOPIDOGREL BISULFATE 75 MG/1
75 TABLET ORAL DAILY
Qty: 30 TABLET | Refills: 0 | Status: SHIPPED | OUTPATIENT
Start: 2024-12-14

## 2024-12-14 NOTE — PROGRESS NOTES
Subjective:     Patient ID: Juan Walker is a 64 year old male.    HPI    Patient comes in today for follow-up visit cardiologist recently had some labs showed slightly low white blood count and abnormal thyroid function test labs done in September overall good but we will repeat to be sure denies any other complaints      History/Other:   Review of Systems   Constitutional: Negative.  Negative for fatigue and fever.   HENT: Negative.  Negative for congestion.    Eyes: Negative.    Respiratory: Negative.  Negative for cough, shortness of breath and wheezing.    Cardiovascular: Negative.  Negative for chest pain, palpitations and leg swelling.   Gastrointestinal: Negative.    Endocrine: Negative for cold intolerance and heat intolerance.   Genitourinary: Negative.  Negative for dysuria, flank pain and hematuria.   Musculoskeletal: Negative.  Negative for arthralgias, back pain and myalgias.   Skin: Negative.    Neurological: Negative.  Negative for dizziness, tremors, syncope, weakness and headaches.   Psychiatric/Behavioral: Negative.  Negative for agitation, behavioral problems and suicidal ideas. The patient is not nervous/anxious.      Current Outpatient Medications   Medication Sig Dispense Refill    docusate sodium 100 MG Oral Cap Take 1 capsule (100 mg total) by mouth 2 (two) times daily as needed. 60 capsule 1    glipiZIDE ER 2.5 MG Oral Tablet 24 Hr Take 1 tablet (2.5 mg total) by mouth daily with breakfast. 90 tablet 3    Sildenafil Citrate (VIAGRA) 100 MG Oral Tab Take 1 tablet (100 mg total) by mouth as needed for Erectile Dysfunction. 9 tablet 2    baclofen 5 MG Oral Tab Take 1 tablet (5 mg total) by mouth nightly as needed. 30 tablet 0    atorvastatin 80 MG Oral Tab Take 1 tablet (80 mg total) by mouth nightly. 90 tablet 1    metoprolol succinate  MG Oral Tablet 24 Hr Take 1 tablet (100 mg total) by mouth Daily Beta Blocker. 90 tablet 1    amLODIPine Besy-Benazepril HCl 5-20 MG Oral Cap Take 1  capsule by mouth daily.      ergocalciferol 1.25 MG (25138 UT) Oral Cap Take 1 capsule (50,000 Units total) by mouth once a week. 12 capsule 0    Clopidogrel Bisulfate 75 MG Oral Tab Take 1 tablet (75 mg total) by mouth daily. 30 tablet 0    metFORMIN 500 MG Oral Tab Take 1 tablet (500 mg total) by mouth 2 (two) times daily with meals. (Patient not taking: Reported on 12/14/2024) 180 tablet 3    ASPIRIN LOW DOSE 81 MG Oral Tab EC TAKE 1 TABLET BY MOUTH EVERY DAY (Patient not taking: Reported on 12/14/2024) 90 tablet 3     Allergies:Allergies[1]    Past Medical History:    History of heart artery stent    Stroke (HCC)    June 2021      No past surgical history on file.   No family history on file.   Social History:   Social History     Socioeconomic History    Marital status:    Tobacco Use    Smoking status: Never     Passive exposure: Never    Smokeless tobacco: Never   Vaping Use    Vaping status: Never Used   Substance and Sexual Activity    Alcohol use: Not Currently    Drug use: Never   Other Topics Concern    Caffeine Concern Yes     Comment: coffee    Exercise Yes     Comment: walking     Social Drivers of Health     Food Insecurity: No Food Insecurity (12/14/2024)    NCSS - Food Insecurity     Worried About Running Out of Food in the Last Year: No     Ran Out of Food in the Last Year: No   Transportation Needs: No Transportation Needs (12/14/2024)    NCSS - Transportation     Lack of Transportation: No    [facility]    Social Connections   Housing Stability: Not At Risk (12/14/2024)    NCSS - Housing/Utilities     Has Housing: Yes     Worried About Losing Housing: No     Unable to Get Utilities: No        Objective:   Physical Exam  Vitals and nursing note reviewed.   Constitutional:       Appearance: He is well-developed.   HENT:      Head: Normocephalic and atraumatic.      Right Ear: External ear normal.      Left Ear: External ear  normal.      Nose: Nose normal.   Eyes:      Conjunctiva/sclera: Conjunctivae normal.      Pupils: Pupils are equal, round, and reactive to light.   Cardiovascular:      Rate and Rhythm: Normal rate and regular rhythm.      Heart sounds: Normal heart sounds.   Pulmonary:      Effort: Pulmonary effort is normal.      Breath sounds: Normal breath sounds.   Abdominal:      General: Bowel sounds are normal.      Palpations: Abdomen is soft.   Genitourinary:     Penis: Normal.       Prostate: Normal.      Rectum: Normal.   Musculoskeletal:         General: Normal range of motion.      Cervical back: Normal range of motion and neck supple.   Skin:     General: Skin is warm and dry.   Neurological:      Mental Status: He is alert and oriented to person, place, and time.      Deep Tendon Reflexes: Reflexes are normal and symmetric.         Assessment & Plan:   1. Abnormal thyroid function test will retest will follow results   2. Leukopenia, unspecified type will retest will follow results    3. Type 2 diabetes mellitus without complication, without long-term current use of insulin (HCC) 11 continue current treatment well-controlled continue current treatment   4. Primary hypertension    5. Coronary artery disease of native artery of native heart with stable angina pectoris (HCC) medical management   6. Encounter for preventive care referred to GI again and also will order stool test       Orders Placed This Encounter   Procedures    Occult Blood Stool, Diagnostic       Meds This Visit:  Requested Prescriptions      No prescriptions requested or ordered in this encounter       Imaging & Referrals:  OP REFERRAL TO [facility] GI TELEPHONE COLON SCREEN            [1]   Allergies  Allergen Reactions    Dust Mite Extract Coughing

## 2024-12-16 DIAGNOSIS — D72.818 OTHER DECREASED WHITE BLOOD CELL (WBC) COUNT: Primary | ICD-10-CM

## 2024-12-31 RX ORDER — BACLOFEN 5 MG/1
5 TABLET ORAL NIGHTLY PRN
Qty: 30 TABLET | Refills: 0 | OUTPATIENT
Start: 2024-12-31

## 2025-02-05 ENCOUNTER — MED REC SCAN ONLY (OUTPATIENT)
Dept: INTERNAL MEDICINE CLINIC | Facility: CLINIC | Age: 65
End: 2025-02-05

## 2025-02-20 RX ORDER — BACLOFEN 5 MG/1
5 TABLET ORAL NIGHTLY PRN
Qty: 30 TABLET | Refills: 0 | Status: SHIPPED | OUTPATIENT
Start: 2025-02-20

## 2025-03-27 RX ORDER — BACLOFEN 5 MG/1
5 TABLET ORAL NIGHTLY PRN
Qty: 30 TABLET | Refills: 0 | Status: SHIPPED | OUTPATIENT
Start: 2025-03-27

## 2025-04-23 ENCOUNTER — APPOINTMENT (OUTPATIENT)
Dept: LAB | Facility: HOSPITAL | Age: 65
End: 2025-04-23
Attending: INTERNAL MEDICINE
Payer: COMMERCIAL

## 2025-04-23 PROCEDURE — 82272 OCCULT BLD FECES 1-3 TESTS: CPT

## 2025-04-26 ENCOUNTER — LAB ENCOUNTER (OUTPATIENT)
Dept: LAB | Facility: HOSPITAL | Age: 65
End: 2025-04-26
Attending: INTERNAL MEDICINE
Payer: COMMERCIAL

## 2025-04-26 DIAGNOSIS — Z00.00 ENCOUNTER FOR PREVENTIVE CARE: ICD-10-CM

## 2025-04-26 DIAGNOSIS — R19.5 POSITIVE OCCULT STOOL BLOOD TEST: Primary | ICD-10-CM

## 2025-04-26 LAB — HEMOCCULT STL QL: POSITIVE

## 2025-05-02 ENCOUNTER — NURSE ONLY (OUTPATIENT)
Facility: CLINIC | Age: 65
End: 2025-05-02

## 2025-05-02 NOTE — PROGRESS NOTES
Dr. Masters -   Called patient for scheduled telephone colon screen.   Please provide colonoscopy and bowel prep orders if appropriate.   Medications, pharmacy, and allergies reviewed.     Age 45-74 y/o: Yes  › MD preference: None  › Insurance: BCBS PPO  › Last PCP visit: Dr. Marei 12/14/24  › Last CBC: 12/14/24  › Date of positive FIT (if applicable): 4/26/2025  › H/W/BMI: 6'1 / 305 lb/ 40.25 BMI    Special comments/notes: Patient is seeing PCP on 5/10/25. He is not taking any BP medications at this time since he is unsure which one(s) he is supposed to be taking. I informed him he will need to let our office know after that appointment.  Telephone Colon Screening Questionnaire Yes No   Are you currently experiencing any GI symptoms [x] []   If yes, explain:     Rectal bleeding - blood in stool intermittently [x] []   Black stool [] [x]   Dysphagia or food \"feeling stuck\" when eating [] [x]   Intractable vomiting [] [x]   Unexplained weight loss [] [x]   First colonoscopy - 5-6 years ago at [facility]; polyps removed  [] [x]   Family history of colon cancer [] [x]   Any issues with anesthesia [] [x]   If yes, explain:      Any recent complaints related to chest pain &/or shortness of breath [] [x]   Referred to a cardiologist?  [x] []   If yes, explain:   Diagnosis from last visit:  Hyperlipidemia  Coronary arteriosclerosis  Ventricular tachycardia  Hypertensive disorder  Venous insufficiency of leg  Erectile dysfunction  Cerebrovascular accident     History of respiratory issues/oxygen/ZACHARY/COPD - ZACHARY [x] []   CPAP/BiPAP: CPAP     History of devices (pacemaker/defibrillator) [] [x]   History of heart attack &/or stroke - MI & stroke 4 years ago  [x] []   If yes, in the last 12 months? Stent placement?  1 stent  [x] []     Medication Reconciliation  Yes  No   Anticoagulants [x] []   Diuretics  [] []   ACE Inhibitors/ARB's  [x] []   Diabetic medication (oral/insulin)  [x] []   Weight loss medication  (phentermine/vyvanse/saxsenda/etc) [] [x]   Iron/herbal/multivitamin supplements [x] []   Usage of marijuana/CBD/vape products  [] [x]

## 2025-05-05 NOTE — PROGRESS NOTES
Patient has plavix listed and several other medications.    He needs to know which medications he is taking.    I would have him schedule an office visit prior to scheduling.

## 2025-05-10 ENCOUNTER — OFFICE VISIT (OUTPATIENT)
Dept: INTERNAL MEDICINE CLINIC | Facility: CLINIC | Age: 65
End: 2025-05-10

## 2025-05-10 VITALS
DIASTOLIC BLOOD PRESSURE: 89 MMHG | SYSTOLIC BLOOD PRESSURE: 132 MMHG | WEIGHT: 303 LBS | HEART RATE: 71 BPM | HEIGHT: 72 IN | BODY MASS INDEX: 41.04 KG/M2

## 2025-05-10 DIAGNOSIS — R79.89 ABNORMAL TSH: ICD-10-CM

## 2025-05-10 DIAGNOSIS — E11.9 TYPE 2 DIABETES MELLITUS WITHOUT COMPLICATION, WITHOUT LONG-TERM CURRENT USE OF INSULIN (HCC): ICD-10-CM

## 2025-05-10 DIAGNOSIS — R19.5 OCCULT BLOOD POSITIVE STOOL: ICD-10-CM

## 2025-05-10 DIAGNOSIS — G89.29 CHRONIC PAIN OF RIGHT ANKLE: ICD-10-CM

## 2025-05-10 DIAGNOSIS — M25.571 CHRONIC PAIN OF RIGHT ANKLE: ICD-10-CM

## 2025-05-10 DIAGNOSIS — G47.33 OSA (OBSTRUCTIVE SLEEP APNEA): ICD-10-CM

## 2025-05-10 DIAGNOSIS — D72.819 LEUKOPENIA, UNSPECIFIED TYPE: ICD-10-CM

## 2025-05-10 DIAGNOSIS — I25.118 CORONARY ARTERY DISEASE OF NATIVE ARTERY OF NATIVE HEART WITH STABLE ANGINA PECTORIS: ICD-10-CM

## 2025-05-10 DIAGNOSIS — I10 PRIMARY HYPERTENSION: Primary | ICD-10-CM

## 2025-05-10 PROCEDURE — 3079F DIAST BP 80-89 MM HG: CPT | Performed by: INTERNAL MEDICINE

## 2025-05-10 PROCEDURE — 3008F BODY MASS INDEX DOCD: CPT | Performed by: INTERNAL MEDICINE

## 2025-05-10 PROCEDURE — 99214 OFFICE O/P EST MOD 30 MIN: CPT | Performed by: INTERNAL MEDICINE

## 2025-05-10 PROCEDURE — 3075F SYST BP GE 130 - 139MM HG: CPT | Performed by: INTERNAL MEDICINE

## 2025-05-10 RX ORDER — LOSARTAN POTASSIUM 50 MG/1
50 TABLET ORAL DAILY
COMMUNITY
End: 2025-05-10

## 2025-05-10 RX ORDER — SILDENAFIL 100 MG/1
100 TABLET, FILM COATED ORAL AS NEEDED
Qty: 9 TABLET | Refills: 2 | Status: SHIPPED | OUTPATIENT
Start: 2025-05-10

## 2025-05-10 NOTE — PROGRESS NOTES
Subjective:     Patient ID: Juan Walker is a 64 year old male.    HPI  Patient comes in today for follow-up overall doing okay denies any complaints today he has been confused about the medication that he supposed to take he has both losartan 50 and also amlodipine/lisinopril medication that he supposed to take.  He  he has been taking the losartan we will stop that from today's clinic continue with amlodipine lisinopril monitor blood pressure..  Recent labs noted thyroid function was slightly off we will retest at also WBC was again on the lower side he will follow-up with hematology patient follows with cardiology he is complaining today of some right ankle pain on and off he gets a sharp pain out of nowhere    History/Other:   Review of Systems   Constitutional: Negative.    HENT: Negative.     Eyes: Negative.    Respiratory: Negative.     Cardiovascular: Negative.    Gastrointestinal: Negative.    Genitourinary: Negative.    Musculoskeletal: Negative.         Rt ankle pain   Skin: Negative.    Neurological: Negative.    Psychiatric/Behavioral: Negative.       Current Medications[1]  Allergies:Allergies[2]    Past Medical History[3]   Past Surgical History[4]   Family History[5]   Social History: Short Social Hx on File[6]     Objective:   Physical Exam  Vitals and nursing note reviewed.   Constitutional:       Appearance: He is well-developed.   HENT:      Head: Normocephalic and atraumatic.      Right Ear: External ear normal.      Left Ear: External ear normal.      Nose: Nose normal.   Eyes:      Conjunctiva/sclera: Conjunctivae normal.      Pupils: Pupils are equal, round, and reactive to light.   Cardiovascular:      Rate and Rhythm: Normal rate and regular rhythm.      Heart sounds: Normal heart sounds.   Pulmonary:      Effort: Pulmonary effort is normal.      Breath sounds: Normal breath sounds.   Abdominal:      General: Bowel sounds are normal.      Palpations: Abdomen is soft.   Genitourinary:      Penis: Normal.       Prostate: Normal.      Rectum: Normal.   Musculoskeletal:         General: Normal range of motion.      Cervical back: Normal range of motion and neck supple.   Skin:     General: Skin is warm and dry.   Neurological:      Mental Status: He is alert and oriented to person, place, and time.      Deep Tendon Reflexes: Reflexes are normal and symmetric.         Assessment & Plan:   1. Primary hypertension will stop losartan and start amlodipine lisinopril monitor blood pressure monitor for any side effects   2. Type 2 diabetes mellitus without complication, without long-term current use of insulin ([facility]) will retest watch diet medication   3. Chronic pain of right ankle   will get an x-ray as needed medication for pain   4. Leukopenia, unspecified type follow-up with hematology    5. Abnormal TSH will retest since    6. ZACHARY (obstructive sleep apnea) he needs to see the sleep specialist as   his machine has been recalled   7. Occult blood positive stool stool test was positive patient will be scheduled for colonoscopy soon he said he spoke with GI already   8. Coronary artery disease of native artery of native heart with stable angina pectoris medical management follows with cardiology       Orders Placed This Encounter   Procedures    TSH W Reflex To Free T4    Hemoglobin A1C    Lipid Panel       Meds This Visit:  Requested Prescriptions     Signed Prescriptions Disp Refills    Sildenafil Citrate (VIAGRA) 100 MG Oral Tab 9 tablet 2     Sig: Take 1 tablet (100 mg total) by mouth as needed for Erectile Dysfunction.       Imaging & Referrals:  PULMONARY - INTERNAL  XR ANKLE (MIN 3 VIEWS), RIGHT (CPT=73610)            [1]   Current Outpatient Medications   Medication Sig Dispense Refill    Sildenafil Citrate (VIAGRA) 100 MG Oral Tab Take 1 tablet (100 mg total) by mouth as needed for Erectile Dysfunction. 9 tablet 2    baclofen 5 MG Oral Tab Take 1 tablet (5 mg total) by mouth nightly as needed. 30  tablet 0    clopidogrel 75 MG Oral Tab Take 1 tablet (75 mg total) by mouth daily. 30 tablet 0    atorvastatin 80 MG Oral Tab Take 1 tablet (80 mg total) by mouth nightly. 90 tablet 1    amLODIPine Besy-Benazepril HCl 5-20 MG Oral Cap Take 1 capsule by mouth daily. 90 capsule 3    glipiZIDE ER 2.5 MG Oral Tablet 24 Hr Take 1 tablet (2.5 mg total) by mouth daily with breakfast. 90 tablet 3    metoprolol succinate  MG Oral Tablet 24 Hr Take 1 tablet (100 mg total) by mouth Daily Beta Blocker. 90 tablet 1   [2]   Allergies  Allergen Reactions    Dust Mite Extract Coughing   [3]   Past Medical History:   History of heart artery stent    Stroke (HCC)    June 2021   [4] No past surgical history on file.  [5] No family history on file.  [6]   Social History  Socioeconomic History    Marital status:    Tobacco Use    Smoking status: Never     Passive exposure: Never    Smokeless tobacco: Never   Vaping Use    Vaping status: Never Used   Substance and Sexual Activity    Alcohol use: Not Currently    Drug use: Never   Other Topics Concern    Caffeine Concern Yes     Comment: coffee    Exercise Yes     Comment: walking     Social Drivers of Health     Food Insecurity: No Food Insecurity (12/14/2024)    NCSS - Food Insecurity     Worried About Running Out of Food in the Last Year: No     Ran Out of Food in the Last Year: No   Transportation Needs: No Transportation Needs (12/14/2024)    NCSS - Transportation     Lack of Transportation: No   Housing Stability: Not At Risk (12/14/2024)    NCSS - Housing/Utilities     Has Housing: Yes     Worried About Losing Housing: No     Unable to Get Utilities: No

## 2025-05-19 ENCOUNTER — TELEPHONE (OUTPATIENT)
Facility: CLINIC | Age: 65
End: 2025-05-19

## 2025-05-19 ENCOUNTER — HOSPITAL ENCOUNTER (OUTPATIENT)
Dept: GENERAL RADIOLOGY | Facility: HOSPITAL | Age: 65
Discharge: HOME | End: 2025-05-19
Attending: INTERNAL MEDICINE
Payer: COMMERCIAL

## 2025-05-19 ENCOUNTER — LAB ENCOUNTER (OUTPATIENT)
Dept: LAB | Facility: HOSPITAL | Age: 65
End: 2025-05-19
Attending: INTERNAL MEDICINE
Payer: COMMERCIAL

## 2025-05-19 DIAGNOSIS — M25.571 CHRONIC PAIN OF RIGHT ANKLE: ICD-10-CM

## 2025-05-19 DIAGNOSIS — R79.89 ABNORMAL TSH: Primary | ICD-10-CM

## 2025-05-19 DIAGNOSIS — D72.819 LEUKOPENIA, UNSPECIFIED TYPE: ICD-10-CM

## 2025-05-19 DIAGNOSIS — E11.9 TYPE 2 DIABETES MELLITUS WITHOUT COMPLICATION, WITHOUT LONG-TERM CURRENT USE OF INSULIN (HCC): ICD-10-CM

## 2025-05-19 DIAGNOSIS — G89.29 CHRONIC PAIN OF RIGHT ANKLE: ICD-10-CM

## 2025-05-19 DIAGNOSIS — R19.5 OCCULT BLOOD POSITIVE STOOL: ICD-10-CM

## 2025-05-19 DIAGNOSIS — G47.33 OSA (OBSTRUCTIVE SLEEP APNEA): ICD-10-CM

## 2025-05-19 DIAGNOSIS — R79.89 ABNORMAL TSH: ICD-10-CM

## 2025-05-19 DIAGNOSIS — I25.118 CORONARY ARTERY DISEASE OF NATIVE ARTERY OF NATIVE HEART WITH STABLE ANGINA PECTORIS: ICD-10-CM

## 2025-05-19 DIAGNOSIS — I10 PRIMARY HYPERTENSION: ICD-10-CM

## 2025-05-19 LAB
CHOLEST SERPL-MCNC: 125 MG/DL (ref ?–200)
EST. AVERAGE GLUCOSE BLD GHB EST-MCNC: 137 MG/DL (ref 68–126)
FASTING PATIENT LIPID ANSWER: YES
HBA1C MFR BLD: 6.4 % (ref ?–5.7)
HDLC SERPL-MCNC: 40 MG/DL (ref 40–59)
LDLC SERPL CALC-MCNC: 71 MG/DL (ref ?–100)
NONHDLC SERPL-MCNC: 85 MG/DL (ref ?–130)
T3FREE SERPL-MCNC: 3.67 PG/ML (ref 2.4–4.2)
T4 FREE SERPL-MCNC: 1.1 NG/DL (ref 0.8–1.7)
TRIGL SERPL-MCNC: 69 MG/DL (ref 30–149)
TSI SER-ACNC: 0.4 UIU/ML (ref 0.55–4.78)
VLDLC SERPL CALC-MCNC: 11 MG/DL (ref 0–30)

## 2025-05-19 PROCEDURE — 84481 FREE ASSAY (FT-3): CPT

## 2025-05-19 PROCEDURE — 80061 LIPID PANEL: CPT

## 2025-05-19 PROCEDURE — 84443 ASSAY THYROID STIM HORMONE: CPT

## 2025-05-19 PROCEDURE — 84439 ASSAY OF FREE THYROXINE: CPT

## 2025-05-19 PROCEDURE — 83036 HEMOGLOBIN GLYCOSYLATED A1C: CPT

## 2025-05-19 PROCEDURE — 36415 COLL VENOUS BLD VENIPUNCTURE: CPT

## 2025-05-19 PROCEDURE — 73610 X-RAY EXAM OF ANKLE: CPT | Performed by: INTERNAL MEDICINE

## 2025-05-19 NOTE — TELEPHONE ENCOUNTER
CSS    I tried to call the pt    The phone rang continuously with no voice mail    Pt had a TCS on 05/02/2025    Dr Masters did not provide orders to schedule a colonoscopy    Dr Masters states pt needs appt in the office    If pt calls back,    Please schedule him with first available APRN

## 2025-05-24 DIAGNOSIS — G89.29 CHRONIC PAIN OF RIGHT ANKLE: Primary | ICD-10-CM

## 2025-05-24 DIAGNOSIS — M25.571 CHRONIC PAIN OF RIGHT ANKLE: Primary | ICD-10-CM

## 2025-05-24 DIAGNOSIS — M79.671 RIGHT FOOT PAIN: ICD-10-CM

## 2025-06-09 RX ORDER — METOPROLOL SUCCINATE 100 MG/1
100 TABLET, EXTENDED RELEASE ORAL
Qty: 90 TABLET | Refills: 3 | Status: SHIPPED | OUTPATIENT
Start: 2025-06-09

## 2025-06-09 NOTE — TELEPHONE ENCOUNTER
Refill Per Protocol     Requested Prescriptions   Pending Prescriptions Disp Refills    METOPROLOL SUCCINATE  MG Oral Tablet 24 Hr [Pharmacy Med Name: METOPROLOL SUCC  MG TAB] 90 tablet 1     Sig: Take 1 tablet (100 mg total) by mouth Daily Beta Blocker.       Hypertension Medications Protocol Passed - 6/9/2025 11:29 AM        Passed - CMP or BMP in past 12 months        Passed - Last BP reading less than 140/90     BP Readings from Last 1 Encounters:   05/10/25 132/89               Passed - In person appointment or virtual visit in the past 12 mos or appointment in next 3 mos     Recent Outpatient Visits              1 month ago Primary hypertension    [facility] Christian Marie MD    Office Visit    1 month ago     [facility]    Nurse Only    5 months ago Abnormal thyroid function test    [facility] Christian Marie MD    Office Visit    7 months ago MCI (mild cognitive impairment)    [facility] Rainer Connell DO    Office Visit    10 months ago     [facility]    Nurse Only          Future Appointments         Provider Department Appt Notes    In 3 months Christian Marie MD [facility] last px 5-2-24    In 4 months Tye Chin DO [facility] ZACHARY (obstructive sleep apnea)    In 4 months Chandler Masters MD [facility] Medications/Colon screening                    Passed - EGFRCR or GFRAA > 50     GFR Evaluation  EGFRCR: 74 , resulted on 9/7/2024          Passed - Medication is active on med list

## 2025-06-26 NOTE — TELEPHONE ENCOUNTER
Patient has appointment scheduled.  Your Appointments      Wednesday October 22, 2025 8:00 AM  Consult with Chandler Masters MD  [facility], [facility] ([facility]) [facility] [address] [address]  [phone]

## (undated) DIAGNOSIS — I10 ESSENTIAL HYPERTENSION, MALIGNANT: Primary | ICD-10-CM

## (undated) NOTE — LETTER
[facility], [place]  Authorization for Invasive Procedures  1.  I hereby authorize Dr. Sindy Weller , my physician and whomever may be designated as the doctor's assistant, to perform the following operation and/or procedure: Cardiac careful testing and screening of blood and blood products, I may still be subject to ill effects as a result of recieving a blood transfusion an/or blood producst. The following are some, but not all, of the potential risks that can occur: fever and allerg acknowledge that my physician has explained sedation/analgesia administration to me including the risks and benefits. I consent to the administration of sedation/analgesia as may be necessary or desirable in the judgment of my physician.      Signature of BEL

## (undated) NOTE — LETTER
[facility], [place]  Authorization for Invasive Procedures  1.  I hereby authorize Dr. Garcia [facility] , my physician and whomever may be designated as the doctor's assistant, to perform the following operation and/or procedure:  Eduard Calderon occur: fever and allergic reactions, hemolytic reactions, transmission of disease such as hepatitis, AIDS, cytomegalovirus (CMV), and flluid overload.  In the event that I wish to have autologous transfusions of my own blood, or a directed donor transfusion Signature of Patient:  ________________________________________________ Date: _________Time: _________    Responsible person in case of minor or unconscious: _____________________________Relationship: ____________     Witness Signature: _________________

## (undated) NOTE — LETTER
[facility]    Consent for Coronary CT Angiography    Your doctor has recommended that you have a Coronary CT Angiography procedure.  Coronary CT Angiography is a diagnostic procedure using computed tomography to scan the can range from very minor to very serious leading to a life threatening situation or even death. Please be sure to communicate any allergy you may have to your caregiver immediately.     The information that is obtained during your testing will be treated a

## (undated) NOTE — LETTER
[address], [place]  Authorization for Invasive Procedures  1.  I hereby authorize Dr. Efraín Sun , my physician and whomever may be designated as the doctor's assistant, to perform the following operation and/or procedure:  *** on [place] hemolytic reactions, transmission of disease such as hepatitis, AIDS, cytomegalovirus (CMV), and flluid overload.  In the event that I wish to have autologous transfusions of my own blood, or a directed donor transfusion, I will discuss this with my physici ________________________________________________ Date: _________Time: _________    Responsible person in case of minor or unconscious: _____________________________Relationship: ____________     Witness Signature: __________________________________________

## (undated) NOTE — LETTER
Date & Time: 5/13/2022, 1:01 PM  Patient: Kelly Lowe      To Whom It May Concern: Francisco Porras was seen and treated in our department on 5/13/2022. He can return to work. If you have any questions or concerns, please do not hesitate to call.           Shimon Chao, DO  Staff Vascular & General Neurology

## (undated) NOTE — LETTER
[facility]  [address] [place] [address]  Phone: [phone]  Fax: [phone]   2/17/2024              Juan Walker        [address] [address]     Hello,     This is the office of Dr. Christian Marie    Thank you for putting your trust in [facility].  Our goal is to deliver the highest quality healthcare and an exceptional patient experience. Upon reviewing of your medical record shows you are due for the following:       Annual Physical       Please call [phone] to schedule your appointment or schedule online via [facility].     If you changed to a new provider at another facility, please notify the clinic to update your records.    If you had any recent testing at another facility, please have your results faxed to our office at [phone].     Thank you and have a great day!

## (undated) NOTE — LETTER
22          Kinsey Martínez  :  7/10/1960      To Whom It May Concern: This patient is under my care. Mr. Hector Marquez has an appointment on May 13, 2022 at 12 pm.     If this office may be of further assistance, please do not hesitate to contact us.       Sincerely,      Kena Rodriguez, DO  Neurology

## (undated) NOTE — LETTER
7/28/2022              [place]/ [address]         To Whom it may concern:     This is to certify that Nasim Gee had an appointment on 7/28/2022 at 11:10 AM with Musa Salguero MD.        Sincerely,    Musa Salguero MD  [facility], [address] [place] [id number] [phone]        Document electronically generated by:  Musa Salguero MD